# Patient Record
Sex: FEMALE | Race: WHITE | NOT HISPANIC OR LATINO | Employment: OTHER | URBAN - METROPOLITAN AREA
[De-identification: names, ages, dates, MRNs, and addresses within clinical notes are randomized per-mention and may not be internally consistent; named-entity substitution may affect disease eponyms.]

---

## 2024-10-12 ENCOUNTER — HOSPITAL ENCOUNTER (INPATIENT)
Facility: HOSPITAL | Age: 84
LOS: 2 days | Discharge: HOME | End: 2024-10-14
Attending: EMERGENCY MEDICINE | Admitting: INTERNAL MEDICINE
Payer: MEDICARE

## 2024-10-12 DIAGNOSIS — J18.9 PNEUMONIA OF LEFT LOWER LOBE DUE TO INFECTIOUS ORGANISM: Primary | ICD-10-CM

## 2024-10-12 DIAGNOSIS — J45.901 ASTHMA WITH ACUTE EXACERBATION, UNSPECIFIED ASTHMA SEVERITY, UNSPECIFIED WHETHER PERSISTENT: ICD-10-CM

## 2024-10-12 LAB
ALBUMIN SERPL-MCNC: 3.5 G/DL (ref 3.5–5.2)
ALBUMIN/GLOB SERPL: 1.1 G/DL
ALP SERPL-CCNC: 132 U/L (ref 39–117)
ALT SERPL W P-5'-P-CCNC: 22 U/L (ref 1–33)
ANION GAP SERPL CALCULATED.3IONS-SCNC: 12 MMOL/L (ref 5–15)
AST SERPL-CCNC: 32 U/L (ref 1–32)
B PARAPERT DNA SPEC QL NAA+PROBE: NOT DETECTED
B PERT DNA SPEC QL NAA+PROBE: NOT DETECTED
BASOPHILS # BLD AUTO: 0.1 10*3/MM3 (ref 0–0.2)
BASOPHILS NFR BLD AUTO: 0.5 % (ref 0–1.5)
BILIRUB SERPL-MCNC: 0.7 MG/DL (ref 0–1.2)
BUN SERPL-MCNC: 11 MG/DL (ref 8–23)
BUN/CREAT SERPL: 13.1 (ref 7–25)
C PNEUM DNA NPH QL NAA+NON-PROBE: NOT DETECTED
CALCIUM SPEC-SCNC: 9.1 MG/DL (ref 8.6–10.5)
CHLORIDE SERPL-SCNC: 98 MMOL/L (ref 98–107)
CO2 SERPL-SCNC: 24 MMOL/L (ref 22–29)
CREAT SERPL-MCNC: 0.84 MG/DL (ref 0.57–1)
D-LACTATE SERPL-SCNC: 0.9 MMOL/L (ref 0.5–2)
DEPRECATED RDW RBC AUTO: 42.6 FL (ref 37–54)
EGFRCR SERPLBLD CKD-EPI 2021: 68.2 ML/MIN/1.73
EOSINOPHIL # BLD AUTO: 0.34 10*3/MM3 (ref 0–0.4)
EOSINOPHIL NFR BLD AUTO: 1.8 % (ref 0.3–6.2)
ERYTHROCYTE [DISTWIDTH] IN BLOOD BY AUTOMATED COUNT: 13.3 % (ref 12.3–15.4)
FLUAV SUBTYP SPEC NAA+PROBE: NOT DETECTED
FLUBV RNA ISLT QL NAA+PROBE: NOT DETECTED
GLOBULIN UR ELPH-MCNC: 3.1 GM/DL
GLUCOSE SERPL-MCNC: 136 MG/DL (ref 65–99)
HADV DNA SPEC NAA+PROBE: NOT DETECTED
HCOV 229E RNA SPEC QL NAA+PROBE: NOT DETECTED
HCOV HKU1 RNA SPEC QL NAA+PROBE: NOT DETECTED
HCOV NL63 RNA SPEC QL NAA+PROBE: NOT DETECTED
HCOV OC43 RNA SPEC QL NAA+PROBE: NOT DETECTED
HCT VFR BLD AUTO: 41 % (ref 34–46.6)
HGB BLD-MCNC: 14.2 G/DL (ref 12–15.9)
HMPV RNA NPH QL NAA+NON-PROBE: NOT DETECTED
HPIV1 RNA ISLT QL NAA+PROBE: NOT DETECTED
HPIV2 RNA SPEC QL NAA+PROBE: NOT DETECTED
HPIV3 RNA NPH QL NAA+PROBE: NOT DETECTED
HPIV4 P GENE NPH QL NAA+PROBE: NOT DETECTED
IMM GRANULOCYTES # BLD AUTO: 0.13 10*3/MM3 (ref 0–0.05)
IMM GRANULOCYTES NFR BLD AUTO: 0.7 % (ref 0–0.5)
LYMPHOCYTES # BLD AUTO: 2.26 10*3/MM3 (ref 0.7–3.1)
LYMPHOCYTES NFR BLD AUTO: 12.2 % (ref 19.6–45.3)
M PNEUMO IGG SER IA-ACNC: NOT DETECTED
MCH RBC QN AUTO: 30.7 PG (ref 26.6–33)
MCHC RBC AUTO-ENTMCNC: 34.6 G/DL (ref 31.5–35.7)
MCV RBC AUTO: 88.6 FL (ref 79–97)
MONOCYTES # BLD AUTO: 1.09 10*3/MM3 (ref 0.1–0.9)
MONOCYTES NFR BLD AUTO: 5.9 % (ref 5–12)
NEUTROPHILS NFR BLD AUTO: 14.61 10*3/MM3 (ref 1.7–7)
NEUTROPHILS NFR BLD AUTO: 78.9 % (ref 42.7–76)
NRBC BLD AUTO-RTO: 0 /100 WBC (ref 0–0.2)
PLATELET # BLD AUTO: 279 10*3/MM3 (ref 140–450)
PMV BLD AUTO: 9.6 FL (ref 6–12)
POTASSIUM SERPL-SCNC: 4.1 MMOL/L (ref 3.5–5.2)
PROT SERPL-MCNC: 6.6 G/DL (ref 6–8.5)
RBC # BLD AUTO: 4.63 10*6/MM3 (ref 3.77–5.28)
RHINOVIRUS RNA SPEC NAA+PROBE: NOT DETECTED
RSV RNA NPH QL NAA+NON-PROBE: NOT DETECTED
SARS-COV-2 RNA NPH QL NAA+NON-PROBE: NOT DETECTED
SODIUM SERPL-SCNC: 134 MMOL/L (ref 136–145)
WBC NRBC COR # BLD AUTO: 18.53 10*3/MM3 (ref 3.4–10.8)

## 2024-10-12 PROCEDURE — 87040 BLOOD CULTURE FOR BACTERIA: CPT | Performed by: PHYSICIAN ASSISTANT

## 2024-10-12 PROCEDURE — 93005 ELECTROCARDIOGRAM TRACING: CPT

## 2024-10-12 PROCEDURE — 83605 ASSAY OF LACTIC ACID: CPT | Performed by: PHYSICIAN ASSISTANT

## 2024-10-12 PROCEDURE — 80053 COMPREHEN METABOLIC PANEL: CPT | Performed by: PHYSICIAN ASSISTANT

## 2024-10-12 PROCEDURE — 0202U NFCT DS 22 TRGT SARS-COV-2: CPT | Performed by: PHYSICIAN ASSISTANT

## 2024-10-12 PROCEDURE — 93005 ELECTROCARDIOGRAM TRACING: CPT | Performed by: EMERGENCY MEDICINE

## 2024-10-12 PROCEDURE — 85025 COMPLETE CBC W/AUTO DIFF WBC: CPT | Performed by: PHYSICIAN ASSISTANT

## 2024-10-12 PROCEDURE — 25010000002 METHYLPREDNISOLONE PER 125 MG: Performed by: PHYSICIAN ASSISTANT

## 2024-10-12 PROCEDURE — 93010 ELECTROCARDIOGRAM REPORT: CPT | Performed by: INTERNAL MEDICINE

## 2024-10-12 PROCEDURE — 36415 COLL VENOUS BLD VENIPUNCTURE: CPT

## 2024-10-12 PROCEDURE — 99285 EMERGENCY DEPT VISIT HI MDM: CPT

## 2024-10-12 PROCEDURE — 25010000002 CEFTRIAXONE PER 250 MG: Performed by: PHYSICIAN ASSISTANT

## 2024-10-12 PROCEDURE — 94799 UNLISTED PULMONARY SVC/PX: CPT

## 2024-10-12 RX ORDER — AMOXICILLIN 250 MG
2 CAPSULE ORAL 2 TIMES DAILY PRN
Status: DISCONTINUED | OUTPATIENT
Start: 2024-10-12 | End: 2024-10-14 | Stop reason: HOSPADM

## 2024-10-12 RX ORDER — IPRATROPIUM BROMIDE AND ALBUTEROL SULFATE 2.5; .5 MG/3ML; MG/3ML
3 SOLUTION RESPIRATORY (INHALATION) ONCE
Status: COMPLETED | OUTPATIENT
Start: 2024-10-12 | End: 2024-10-12

## 2024-10-12 RX ORDER — BISACODYL 5 MG/1
5 TABLET, DELAYED RELEASE ORAL DAILY PRN
Status: DISCONTINUED | OUTPATIENT
Start: 2024-10-12 | End: 2024-10-14 | Stop reason: HOSPADM

## 2024-10-12 RX ORDER — ONDANSETRON 4 MG/1
4 TABLET, ORALLY DISINTEGRATING ORAL EVERY 6 HOURS PRN
Status: DISCONTINUED | OUTPATIENT
Start: 2024-10-12 | End: 2024-10-14 | Stop reason: HOSPADM

## 2024-10-12 RX ORDER — POLYETHYLENE GLYCOL 3350 17 G/17G
17 POWDER, FOR SOLUTION ORAL DAILY PRN
Status: DISCONTINUED | OUTPATIENT
Start: 2024-10-12 | End: 2024-10-14 | Stop reason: HOSPADM

## 2024-10-12 RX ORDER — ACETAMINOPHEN 650 MG/1
650 SUPPOSITORY RECTAL EVERY 4 HOURS PRN
Status: DISCONTINUED | OUTPATIENT
Start: 2024-10-12 | End: 2024-10-13

## 2024-10-12 RX ORDER — ONDANSETRON 2 MG/ML
4 INJECTION INTRAMUSCULAR; INTRAVENOUS EVERY 6 HOURS PRN
Status: DISCONTINUED | OUTPATIENT
Start: 2024-10-12 | End: 2024-10-14 | Stop reason: HOSPADM

## 2024-10-12 RX ORDER — ASPIRIN 325 MG
325 TABLET ORAL DAILY
Status: DISCONTINUED | OUTPATIENT
Start: 2024-10-13 | End: 2024-10-14 | Stop reason: HOSPADM

## 2024-10-12 RX ORDER — METHYLPREDNISOLONE SODIUM SUCCINATE 125 MG/2ML
125 INJECTION, POWDER, LYOPHILIZED, FOR SOLUTION INTRAMUSCULAR; INTRAVENOUS ONCE
Status: COMPLETED | OUTPATIENT
Start: 2024-10-12 | End: 2024-10-12

## 2024-10-12 RX ORDER — ALBUTEROL SULFATE 0.83 MG/ML
2.5 SOLUTION RESPIRATORY (INHALATION)
Status: COMPLETED | OUTPATIENT
Start: 2024-10-12 | End: 2024-10-12

## 2024-10-12 RX ORDER — BISACODYL 10 MG
10 SUPPOSITORY, RECTAL RECTAL DAILY PRN
Status: DISCONTINUED | OUTPATIENT
Start: 2024-10-12 | End: 2024-10-14 | Stop reason: HOSPADM

## 2024-10-12 RX ORDER — ALBUTEROL SULFATE 0.83 MG/ML
2.5 SOLUTION RESPIRATORY (INHALATION) EVERY 6 HOURS PRN
Status: DISCONTINUED | OUTPATIENT
Start: 2024-10-12 | End: 2024-10-14 | Stop reason: HOSPADM

## 2024-10-12 RX ORDER — ACETAMINOPHEN 325 MG/1
650 TABLET ORAL EVERY 4 HOURS PRN
Status: DISCONTINUED | OUTPATIENT
Start: 2024-10-12 | End: 2024-10-14 | Stop reason: HOSPADM

## 2024-10-12 RX ORDER — METHYLPREDNISOLONE SODIUM SUCCINATE 40 MG/ML
40 INJECTION, POWDER, LYOPHILIZED, FOR SOLUTION INTRAMUSCULAR; INTRAVENOUS EVERY 8 HOURS
Status: DISCONTINUED | OUTPATIENT
Start: 2024-10-12 | End: 2024-10-13

## 2024-10-12 RX ORDER — IPRATROPIUM BROMIDE AND ALBUTEROL SULFATE 2.5; .5 MG/3ML; MG/3ML
3 SOLUTION RESPIRATORY (INHALATION)
Status: DISCONTINUED | OUTPATIENT
Start: 2024-10-12 | End: 2024-10-14 | Stop reason: HOSPADM

## 2024-10-12 RX ORDER — ACETAMINOPHEN 160 MG/5ML
650 SOLUTION ORAL EVERY 4 HOURS PRN
Status: DISCONTINUED | OUTPATIENT
Start: 2024-10-12 | End: 2024-10-13

## 2024-10-12 RX ADMIN — CEFTRIAXONE SODIUM 1000 MG: 1 INJECTION, POWDER, FOR SOLUTION INTRAMUSCULAR; INTRAVENOUS at 18:14

## 2024-10-12 RX ADMIN — ALBUTEROL SULFATE 2.5 MG: 2.5 SOLUTION RESPIRATORY (INHALATION) at 19:45

## 2024-10-12 RX ADMIN — METHYLPREDNISOLONE SODIUM SUCCINATE 125 MG: 125 INJECTION INTRAMUSCULAR; INTRAVENOUS at 18:12

## 2024-10-12 RX ADMIN — IPRATROPIUM BROMIDE AND ALBUTEROL SULFATE 3 ML: .5; 3 SOLUTION RESPIRATORY (INHALATION) at 18:02

## 2024-10-12 RX ADMIN — ALBUTEROL SULFATE 2.5 MG: 2.5 SOLUTION RESPIRATORY (INHALATION) at 19:40

## 2024-10-12 NOTE — ED NOTES
Pt to ED from [facility] via pv. Pt seen at urgent care [facility] diagnosed with pneumonia and sent to ED.

## 2024-10-12 NOTE — ED NOTES
Nursing report ED to floor  Sisi Francisco  84 y.o.  female    HPI :  HPI  Stated Reason for Visit: soa, cough  History Obtained From: patient, family    Chief Complaint  Chief Complaint   Patient presents with    Shortness of Breath    Cough       Admitting doctor:   Mercedes Gleason MD    Admitting diagnosis:   The primary encounter diagnosis was Pneumonia of left lower lobe due to infectious organism. A diagnosis of Asthma with acute exacerbation, unspecified asthma severity, unspecified whether persistent was also pertinent to this visit.    Code status:   Current Code Status       Date Active Code Status Order ID Comments User Context       10/12/2024 1955 CPR (Attempt to Resuscitate) [id number]  Mercedes Gleason MD ED        Question Answer    Code Status (Patient has no pulse and is not breathing) CPR (Attempt to Resuscitate)    Medical Interventions (Patient has pulse or is breathing) Full                    Allergies:   Amlodipine besylate, Lisinopril, Losartan, and Simvastatin    Isolation:   No active isolations    Intake and Output    Intake/Output Summary (Last 24 hours) at 10/12/2024 1956  Last data filed at 10/12/2024 1859  Gross per 24 hour   Intake 100 ml   Output --   Net 100 ml       Weight:       10/12/24  1715   Weight: 77.1 kg (170 lb)       Most recent vitals:   Vitals:    10/12/24 1802 10/12/24 1831 10/12/24 1901 10/12/24 1902   BP:  129/81 132/83    BP Location:       Patient Position:       Pulse: 97 98 99 100   Resp: 18      Temp:       SpO2: 93% 92% 92% 92%   Weight:       Height:           Active LDAs/IV Access:   Lines, Drains & Airways       Active LDAs       Name Placement date Placement time Site Days    Peripheral IV 10/12/24 1737 Left Antecubital 10/12/24  1737  Antecubital  less than 1                    Labs (abnormal labs have a star):   Labs Reviewed   COMPREHENSIVE METABOLIC PANEL - Abnormal; Notable for the following components:       Result Value    Glucose 136  (*)     Sodium 134 (*)     Alkaline Phosphatase 132 (*)     All other components within normal limits    Narrative:     GFR Normal >60  Chronic Kidney Disease <60  Kidney Failure <15    The GFR formula is only valid for adults with stable renal function between ages 18 and 70.   CBC WITH AUTO DIFFERENTIAL - Abnormal; Notable for the following components:    WBC 18.53 (*)     Neutrophil % 78.9 (*)     Lymphocyte % 12.2 (*)     Immature Grans % 0.7 (*)     Neutrophils, Absolute 14.61 (*)     Monocytes, Absolute 1.09 (*)     Immature Grans, Absolute 0.13 (*)     All other components within normal limits   RESPIRATORY PANEL PCR W/ COVID-19 (SARS-COV-2), NP SWAB IN UTM/VTP, 2 HR TAT - Normal    Narrative:     In the setting of a positive respiratory panel with a viral infection PLUS a negative procalcitonin without other underlying concern for bacterial infection, consider observing off antibiotics or discontinuation of antibiotics and continue supportive care. If the respiratory panel is positive for atypical bacterial infection (Bordetella pertussis, Chlamydophila pneumoniae, or Mycoplasma pneumoniae), consider antibiotic de-escalation to target atypical bacterial infection.   LACTIC ACID, PLASMA - Normal   BLOOD CULTURE   BLOOD CULTURE   CBC AND DIFFERENTIAL    Narrative:     The following orders were created for panel order CBC & Differential.  Procedure                               Abnormality         Status                     ---------                               -----------         ------                     CBC Auto Differential[[id number]]        Abnormal            Final result                 Please view results for these tests on the individual orders.       EKG:   ECG 12 Lead Dyspnea   Preliminary Result   HEART JNTU=501  bpm   RR Gyudoqop=306  ms   CA Sptcjtyz=570  ms   P Horizontal Axis=-18  deg   P Front Axis=41  deg   QRSD Interval=92  ms   QT Udczfpgl=980  ms   COgD=781  ms   QRS Axis=4  deg   T Wave  Axis=46  deg   - ABNORMAL ECG -   Sinus tachycardia   Atrial premature complexes   Low voltage, precordial leads   RSR' in V1 or V2, right VCD or RVH   Date and Time of Study:2024-10-12 17:16:47          Meds given in ED:   Medications   acetaminophen (TYLENOL) tablet 650 mg (has no administration in time range)     Or   acetaminophen (TYLENOL) 160 MG/5ML oral solution 650 mg (has no administration in time range)     Or   acetaminophen (TYLENOL) suppository 650 mg (has no administration in time range)   ondansetron ODT (ZOFRAN-ODT) disintegrating tablet 4 mg (has no administration in time range)     Or   ondansetron (ZOFRAN) injection 4 mg (has no administration in time range)   melatonin tablet 2.5 mg (has no administration in time range)   sennosides-docusate (PERICOLACE) 8.6-50 MG per tablet 2 tablet (has no administration in time range)     And   polyethylene glycol (MIRALAX) packet 17 g (has no administration in time range)     And   bisacodyl (DULCOLAX) EC tablet 5 mg (has no administration in time range)     And   bisacodyl (DULCOLAX) suppository 10 mg (has no administration in time range)   methylPREDNISolone sodium succinate (SOLU-Medrol) injection 40 mg (has no administration in time range)   ipratropium-albuterol (DUO-NEB) nebulizer solution 3 mL (has no administration in time range)   albuterol (PROVENTIL) nebulizer solution 0.083% 2.5 mg/3mL (has no administration in time range)   cefTRIAXone (ROCEPHIN) 1,000 mg in sodium chloride 0.9 % 100 mL MBP (0 mg Intravenous Stopped 10/12/24 1859)   ipratropium-albuterol (DUO-NEB) nebulizer solution 3 mL (3 mL Nebulization Given 10/12/24 1802)   methylPREDNISolone sodium succinate (SOLU-Medrol) injection 125 mg (125 mg Intravenous Given 10/12/24 1812)   albuterol (PROVENTIL) nebulizer solution 0.083% 2.5 mg/3mL (2.5 mg Nebulization Given 10/12/24 1945)       Imaging results:  No radiology results for the last day    Ambulatory status:   - Standby    Social  issues:   Social History     Socioeconomic History    Marital status:    Tobacco Use    Smoking status: Never    Smokeless tobacco: Never   Vaping Use    Vaping status: Never Used   Substance and Sexual Activity    Alcohol use: Never    Drug use: Never    Sexual activity: Not Currently       Peripheral Neurovascular  Peripheral Neurovascular (Adult)  Peripheral Neurovascular WDL: WDL    Neuro Cognitive  Neuro Cognitive (Adult)  Cognitive/Neuro/Behavioral WDL: WDL    Learning  Learning Assessment  Learning Readiness and Ability: no barriers identified    Respiratory  Respiratory WDL  Respiratory WDL: .WDL except, cough  Cough Frequency: frequent  Cough Type: congested    Abdominal Pain       Pain Assessments  Pain (Adult)  (0-10) Pain Rating: Rest: 0    NIH Stroke Scale       Akanksha Olsen RN  10/12/24 19:56 EDT

## 2024-10-12 NOTE — ED PROVIDER NOTES
MD ATTESTATION NOTE  I supervised care provided by the midlevel provider. We have discussed this patient's history, physical exam, and treatment plan. I have reviewed the midlevel provider's note and I agree with the midlevel provider's findings and plan of care.   SHARED VISIT: This visit was performed by BOTH a physician and an APC. The substantive portion of the medical decision making was performed by this attesting physician who made or approved the management plan and takes responsibility for patient management. All studies in the APC note (if performed) were independently interpreted by me.   I have personally had a face to face encounter with the patient.     PCP: Provider, No Known  Patient Care Team:  Provider, No Known as PCP -      Sisi Francisco is a 84 y.o. female who presents to the ED c/o shortness of breath.  Patient reports that she has been feeling ill for last couple weeks.  Patient reports that she has had a productive cough, chills, short of breath.  Patient denies any chest pain.  No fevers.  Patient reports that she recently was treated with steroids as well as amoxicillin without relief.  Patient reports that she was seen at urgent care today and referred to the emergency department for evaluation.    On exam:  General: NAD.  Head: NCAT.  ENT: nares patent, no scleral icterus  Neck: Supple, trachea midline.  Cardiac: regular rate and rhythm.  Lungs: normal effort, crackles left lower lung  Abdomen: Soft, nondistended, NTTP, no rebound tenderness, no guarding or rigidity.   Extremities: Moves all extremities well, no peripheral edema  Neuro: alert, MAEW, follows commands  Psych: calm, cooperative  Skin: Warm, dry.    Medical Decision Making:  After the initial H&P, I discussed pertinent information from history and physical exam with patient/family.  Discussed differential diagnosis.  Discussed plan for ED evaluation/work-up/treatment.  All questions answered.  Patient/family is  agreeable with plan.    ED Course as of 10/12/24 2221   Sat Oct 12, 2024   1847 WBC(!): 18.53 [KA]   1847 Lactate: 0.9 [KA]   1847 Glucose(!): 136 [KA]   1847 Creatinine: 0.84 [KA]   1852 My Independent interpretation of the EKG performed at 1716 is sinus tachycardia rate 102 normal axis normal intervals no ST elevation, no prior for comparison. [KA]   1943 I reassessed the patient, still has some wheezing and rhonchi, additional nebs ordered.  Counseled her and daughter at the bedside and all of her lab and imaging results.  Due to her leukocytosis, asthma exacerbation, acute left lower lobe pneumonia, recommend admission for further evaluation and treatment and she is agreeable [KA]   1946 I discussed patient with Dr. Gleason, hospitalist including history presentation workup and she agrees to admit. [KA]      ED Course User Index  [KA] Janett Santana PA-C       Diagnosis  Final diagnoses:   Pneumonia of left lower lobe due to infectious organism   Asthma with acute exacerbation, unspecified asthma severity, unspecified whether persistent          Geraldo Jackson MD  10/12/24 2221
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
## 2024-10-13 PROBLEM — J45.901 ASTHMA EXACERBATION: Status: ACTIVE | Noted: 2024-10-13

## 2024-10-13 LAB
ANION GAP SERPL CALCULATED.3IONS-SCNC: 10 MMOL/L (ref 5–15)
BUN SERPL-MCNC: 14 MG/DL (ref 8–23)
BUN/CREAT SERPL: 18.9 (ref 7–25)
CALCIUM SPEC-SCNC: 9 MG/DL (ref 8.6–10.5)
CHLORIDE SERPL-SCNC: 102 MMOL/L (ref 98–107)
CO2 SERPL-SCNC: 23 MMOL/L (ref 22–29)
CREAT SERPL-MCNC: 0.74 MG/DL (ref 0.57–1)
DEPRECATED RDW RBC AUTO: 43 FL (ref 37–54)
EGFRCR SERPLBLD CKD-EPI 2021: 79.4 ML/MIN/1.73
ERYTHROCYTE [DISTWIDTH] IN BLOOD BY AUTOMATED COUNT: 13.1 % (ref 12.3–15.4)
GLUCOSE SERPL-MCNC: 225 MG/DL (ref 65–99)
HCT VFR BLD AUTO: 38.5 % (ref 34–46.6)
HGB BLD-MCNC: 12.6 G/DL (ref 12–15.9)
MCH RBC QN AUTO: 28.8 PG (ref 26.6–33)
MCHC RBC AUTO-ENTMCNC: 32.7 G/DL (ref 31.5–35.7)
MCV RBC AUTO: 88.1 FL (ref 79–97)
PLATELET # BLD AUTO: 262 10*3/MM3 (ref 140–450)
PMV BLD AUTO: 9.5 FL (ref 6–12)
POTASSIUM SERPL-SCNC: 3.8 MMOL/L (ref 3.5–5.2)
QT INTERVAL: 333 MS
QTC INTERVAL: 434 MS
RBC # BLD AUTO: 4.37 10*6/MM3 (ref 3.77–5.28)
SODIUM SERPL-SCNC: 135 MMOL/L (ref 136–145)
WBC NRBC COR # BLD AUTO: 13.36 10*3/MM3 (ref 3.4–10.8)

## 2024-10-13 PROCEDURE — 85027 COMPLETE CBC AUTOMATED: CPT | Performed by: INTERNAL MEDICINE

## 2024-10-13 PROCEDURE — 94664 DEMO&/EVAL PT USE INHALER: CPT

## 2024-10-13 PROCEDURE — 63710000001 PREDNISONE PER 1 MG: Performed by: HOSPITALIST

## 2024-10-13 PROCEDURE — 25010000002 METHYLPREDNISOLONE PER 40 MG: Performed by: INTERNAL MEDICINE

## 2024-10-13 PROCEDURE — 36415 COLL VENOUS BLD VENIPUNCTURE: CPT | Performed by: INTERNAL MEDICINE

## 2024-10-13 PROCEDURE — 94799 UNLISTED PULMONARY SVC/PX: CPT

## 2024-10-13 PROCEDURE — 97110 THERAPEUTIC EXERCISES: CPT

## 2024-10-13 PROCEDURE — 25010000002 CEFTRIAXONE PER 250 MG: Performed by: HOSPITALIST

## 2024-10-13 PROCEDURE — 94761 N-INVAS EAR/PLS OXIMETRY MLT: CPT

## 2024-10-13 PROCEDURE — 97162 PT EVAL MOD COMPLEX 30 MIN: CPT

## 2024-10-13 PROCEDURE — 80048 BASIC METABOLIC PNL TOTAL CA: CPT | Performed by: INTERNAL MEDICINE

## 2024-10-13 RX ORDER — CEFDINIR 300 MG/1
300 CAPSULE ORAL EVERY 12 HOURS SCHEDULED
Status: DISCONTINUED | OUTPATIENT
Start: 2024-10-14 | End: 2024-10-14 | Stop reason: HOSPADM

## 2024-10-13 RX ORDER — PREDNISONE 20 MG/1
40 TABLET ORAL
Status: DISCONTINUED | OUTPATIENT
Start: 2024-10-13 | End: 2024-10-14 | Stop reason: HOSPADM

## 2024-10-13 RX ADMIN — METHYLPREDNISOLONE SODIUM SUCCINATE 40 MG: 40 INJECTION, POWDER, FOR SOLUTION INTRAMUSCULAR; INTRAVENOUS at 01:17

## 2024-10-13 RX ADMIN — ASPIRIN 325 MG: 325 TABLET ORAL at 08:52

## 2024-10-13 RX ADMIN — PREDNISONE 40 MG: 20 TABLET ORAL at 14:47

## 2024-10-13 RX ADMIN — CEFTRIAXONE 2000 MG: 2 INJECTION, POWDER, FOR SOLUTION INTRAMUSCULAR; INTRAVENOUS at 14:48

## 2024-10-13 RX ADMIN — IPRATROPIUM BROMIDE AND ALBUTEROL SULFATE 3 ML: 2.5; .5 SOLUTION RESPIRATORY (INHALATION) at 07:28

## 2024-10-13 RX ADMIN — IPRATROPIUM BROMIDE AND ALBUTEROL SULFATE 3 ML: 2.5; .5 SOLUTION RESPIRATORY (INHALATION) at 19:57

## 2024-10-13 RX ADMIN — METHYLPREDNISOLONE SODIUM SUCCINATE 40 MG: 40 INJECTION, POWDER, FOR SOLUTION INTRAMUSCULAR; INTRAVENOUS at 10:37

## 2024-10-13 RX ADMIN — IPRATROPIUM BROMIDE AND ALBUTEROL SULFATE 3 ML: 2.5; .5 SOLUTION RESPIRATORY (INHALATION) at 15:13

## 2024-10-13 NOTE — THERAPY EVALUATION
Patient Name: Sisi Francisco  : 1940    MRN: [id number]                              Today's Date: 10/13/2024       Admit Date: 10/12/2024    Visit Dx:     ICD-10-CM ICD-9-CM   1. Pneumonia of left lower lobe due to infectious organism  J18.9 486   2. Asthma with acute exacerbation, unspecified asthma severity, unspecified whether persistent  J45.901 493.92     Patient Active Problem List   Diagnosis    Left lower lobe pneumonia     History reviewed. No pertinent past medical history.  History reviewed. No pertinent surgical history.   General Information       Row Name 10/13/24 0949          Physical Therapy Time and Intention    Document Type evaluation  -CS     Mode of Treatment physical therapy  -CS       Row Name 10/13/24 0949          General Information    Patient Profile Reviewed yes  -CS     Prior Level of Function independent:  -CS       Row Name 10/13/24 0949          Cognition    Orientation Status (Cognition) oriented x 3  -CS       Row Name 10/13/24 0949          Safety Issues/Impairments Affecting Functional Mobility    Impairments Affecting Function (Mobility) balance;endurance/activity tolerance;strength  -CS               User Key  (r) = Recorded By, (t) = Taken By, (c) = Cosigned By      Initials Name Provider Type    CS Emile Gonzales, PT Physical Therapist                   Mobility       Row Name 10/13/24 0949          Bed Mobility    Bed Mobility supine-sit  -CS     Supine-Sit [place] (Bed Mobility) modified independence  -CS       Row Name 10/13/24 0949          Sit-Stand Transfer    Sit-Stand [place] (Transfers) standby assist  -CS       Row Name 10/13/24 0949          Gait/Stairs (Locomotion)    [place] Level (Gait) minimum assist (75% patient effort)  -CS     Distance in Feet (Gait) 120  2 standing rest breaks for fatigeu  -CS               User Key  (r) = Recorded By, (t) = Taken By, (c) = Cosigned By      Initials Name Provider Type    Emile Rojas, PT  Physical Therapist                   Obj/Interventions       Row Name 10/13/24 0950          Range of Motion Comprehensive    General Range of Motion no range of motion deficits identified  -CS       Row Name 10/13/24 0950          Strength Comprehensive (MMT)    General Manual Muscle Testing (MMT) Assessment no strength deficits identified  -CS               User Key  (r) = Recorded By, (t) = Taken By, (c) = Cosigned By      Initials Name Provider Type    Emile Rojas, PT Physical Therapist                   Goals/Plan       Row Name 10/13/24 0951          Bed Mobility Goal 1 (PT)    Activity/Assistive Device (Bed Mobility Goal 1, PT) bed mobility activities, all  -CS     [place] Level/Cues Needed (Bed Mobility Goal 1, PT) independent  -CS     Time Frame (Bed Mobility Goal 1, PT) 1 week  -CS       Row Name 10/13/24 0951          Transfer Goal 1 (PT)    Activity/Assistive Device (Transfer Goal 1, PT) sit-to-stand/stand-to-sit;bed-to-chair/chair-to-bed  -CS     [place] Level/Cues Needed (Transfer Goal 1, PT) independent  -CS     Time Frame (Transfer Goal 1, PT) 1 week  -CS       Row Name 10/13/24 0951          Gait Training Goal 1 (PT)    Activity/Assistive Device (Gait Training Goal 1, PT) assistive device use  -CS     [place] Level (Gait Training Goal 1, PT) modified independence  -CS     Distance (Gait Training Goal 1, PT) 300  -CS     Time Frame (Gait Training Goal 1, PT) 1 week  -CS       Row Name 10/13/24 0951          Therapy Assessment/Plan (PT)    Planned Therapy Interventions (PT) balance training;bed mobility training;gait training;home exercise program;manual therapy techniques;neuromuscular re-education;transfer training;stretching;strengthening;stair training;ROM (range of motion);patient/family education  -CS               User Key  (r) = Recorded By, (t) = Taken By, (c) = Cosigned By      Initials Name Provider Type    Emile Rojas, PT Physical Therapist                    Clinical Impression       Row Name 10/13/24 0950          Pain    Pretreatment Pain Rating 0/10 - no pain  -CS     Posttreatment Pain Rating 0/10 - no pain  -CS       Row Name 10/13/24 0950          Plan of Care Review    Plan of Care Reviewed With patient  -CS       Row Name 10/13/24 0950          Therapy Assessment/Plan (PT)    Patient/Family Therapy Goals Statement (PT) home  -CS     Criteria for Skilled Interventions Met (PT) yes  -CS     Therapy Frequency (PT) 6 times/wk  -CS       Row Name 10/13/24 0950          Vital Signs    O2 Delivery Pre Treatment supplemental O2  -CS     O2 Delivery Intra Treatment room air  -CS     O2 Delivery Post Treatment room air  -CS       Row Name 10/13/24 0950          Positioning and Restraints    Pre-Treatment Position in bed  -CS     Post Treatment Position bed  -CS     In Bed supine;call light within reach;encouraged to call for assist;exit alarm on;notified nsg;with family/caregiver  -CS               User Key  (r) = Recorded By, (t) = Taken By, (c) = Cosigned By      Initials Name Provider Type    Emile Rojas, PT Physical Therapist                   Outcome Measures       Row Name 10/13/24 0951 10/13/24 0600       How much help from another person do you currently need...    Turning from your back to your side while in flat bed without using bedrails? 4  -CS 3  -BZ    Moving from lying on back to sitting on the side of a flat bed without bedrails? 3  -CS 3  -BZ    Moving to and from a bed to a chair (including a wheelchair)? 3  -CS 3  -BZ    Standing up from a chair using your arms (e.g., wheelchair, bedside chair)? 3  -CS 3  -BZ    Climbing 3-5 steps with a railing? 3  -CS 3  -BZ    To walk in hospital room? 3  -CS 3  -BZ    AM-PAC 6 Clicks Score (PT) 19  -CS 18  -BZ    Highest Level of Mobility Goal 6 --> Walk 10 steps or more  -CS 6 --> Walk 10 steps or more  -BZ      Row Name 10/13/24 0951          Functional Assessment    Outcome Measure Options AM-PAC 6 Clicks  Basic Mobility (PT)  -               User Key  (r) = Recorded By, (t) = Taken By, (c) = Cosigned By      Initials Name Provider Type    Emile Rojas, PT Physical Therapist    Lakshmi Griffith RN Registered Nurse                                 Physical Therapy Education       Title: PT OT SLP Therapies (Done)       Topic: Physical Therapy (Done)       Point: Mobility training (Done)       Learning Progress Summary            Patient Acceptance, E,TB, VU,NR by  at 10/13/2024 0951                      Point: Home exercise program (Done)       Learning Progress Summary            Patient Acceptance, E,TB, VU,NR by  at 10/13/2024 0951                      Point: Body mechanics (Done)       Learning Progress Summary            Patient Acceptance, E,TB, VU,NR by  at 10/13/2024 0951                      Point: Precautions (Done)       Learning Progress Summary            Patient Acceptance, E,TB, VU,NR by  at 10/13/2024 0951                                      User Key       Initials Effective Dates Name Provider Type Discipline     07/11/23 -  Emile Gonzales, PT Physical Therapist PT                  PT Recommendation and Plan  Planned Therapy Interventions (PT): balance training, bed mobility training, gait training, home exercise program, manual therapy techniques, neuromuscular re-education, transfer training, stretching, strengthening, stair training, ROM (range of motion), patient/family education        Time Calculation:         PT Charges       Row Name 10/13/24 0954             Time Calculation    Start Time 0933  -      Stop Time 0948  -      Time Calculation (min) 15 min  -      PT Received On 10/13/24  -      PT - Next Appointment 10/14/24  -      PT Goal Re-Cert Due Date 10/20/24  -                User Key  (r) = Recorded By, (t) = Taken By, (c) = Cosigned By      Initials Name Provider Type    Emile Rojas, PT Physical Therapist                  Therapy Charges for  Today       Code Description Service Date Service Provider Modifiers Qty    20240043346 HC PT EVAL MOD COMPLEXITY 2 10/13/2024 Emile Gonzales, PT GP 1    29912061255 HC PT THER PROC EA 15 MIN 10/13/2024 Emile Gonzales, PT GP 1            PT G-Codes  Outcome Measure Options: AM-PAC 6 Clicks Basic Mobility (PT)  AM-PAC 6 Clicks Score (PT): 19  PT Discharge Summary  Anticipated Discharge Disposition (PT): home with home health, home with assist    Emile Gonzales, PT  10/13/2024
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
## 2024-10-13 NOTE — PLAN OF CARE
Goal Outcome Evaluation:  Plan of Care Reviewed With: patient      Pt admitted with L lower lobe pneumonia and presents with weakness. This visit she was able to perform bed mobility SBA, CGA with sit<> stand and [place] with walking 120' and tolerated with moderate complaints of fatigue. She needed a standing rest break while walking. She lives in [place] and is staying in a hotel here for the next couple of days per family. PT recommendation is to use a walker until she gains more strength and HHPT at d/c to improve functional mobility.           Anticipated Discharge Disposition (PT): home with home health, home with assist
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
## 2024-10-14 VITALS
RESPIRATION RATE: 18 BRPM | HEIGHT: 66 IN | WEIGHT: 168.1 LBS | OXYGEN SATURATION: 96 % | DIASTOLIC BLOOD PRESSURE: 72 MMHG | SYSTOLIC BLOOD PRESSURE: 148 MMHG | TEMPERATURE: 97.9 F | BODY MASS INDEX: 27.02 KG/M2 | HEART RATE: 68 BPM

## 2024-10-14 PROCEDURE — 97530 THERAPEUTIC ACTIVITIES: CPT

## 2024-10-14 PROCEDURE — 63710000001 PREDNISONE PER 1 MG: Performed by: HOSPITALIST

## 2024-10-14 RX ORDER — CEFDINIR 300 MG/1
300 CAPSULE ORAL EVERY 12 HOURS SCHEDULED
Qty: 10 CAPSULE | Refills: 0 | Status: ON HOLD | OUTPATIENT
Start: 2024-10-14 | End: 2024-10-18

## 2024-10-14 RX ORDER — ALBUTEROL SULFATE 90 UG/1
2 INHALANT RESPIRATORY (INHALATION) EVERY 4 HOURS PRN
Qty: 18 G | Refills: 0 | Status: ON HOLD | OUTPATIENT
Start: 2024-10-14 | End: 2024-10-18

## 2024-10-14 RX ADMIN — PREDNISONE 40 MG: 20 TABLET ORAL at 09:50

## 2024-10-14 RX ADMIN — CEFDINIR 300 MG: 300 CAPSULE ORAL at 09:50

## 2024-10-14 RX ADMIN — ASPIRIN 325 MG: 325 TABLET ORAL at 09:50

## 2024-10-14 NOTE — PLAN OF CARE
Goal Outcome Evaluation:  Plan of Care Reviewed With: patient        Progress: improving  Outcome Evaluation: Pt seen for PT this AM - trial ambulation without AD x 200' - SBA but frequent small LOB noted that pt recovers indep with stepping strategy. When using rwx x 200' SBA no LOB and improve walking pace noted. Did note de-sat to 70s first ambulation trial but pleth questionable - replaced finger probe and pleth improved, O2 in the 90s second ambulation trial. Educated pt on decreasing fall risk and using rwx at home, especially for right now. Also educated pt on potential a 4ww in the future if needed. She continues to benefit from skilled PT to address mobility deficits. Recommend home with  home health    Anticipated Discharge Disposition (PT): home with home health, home with assist

## 2024-10-14 NOTE — CASE MANAGEMENT/SOCIAL WORK
Case Management Discharge Note      Final Note: Home with [facility], walker thru [facility]. No additional CCP needs.         Selected Continued Care - Admitted Since 10/12/2024       Destination    No services have been selected for the patient.                Durable Medical Equipment    No services have been selected for the patient.                Dialysis/Infusion    No services have been selected for the patient.                Home Medical Care       Service Provider Services Address Phone Fax Patient Preferred    [facility] [address] [place] [id number] [phone] --              Therapy    No services have been selected for the patient.                Community Resources    No services have been selected for the patient.                Community & DME    No services have been selected for the patient.                         Final Discharge Disposition Code: 01 - home or self-care
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
## 2024-10-14 NOTE — PLAN OF CARE
Goal Outcome Evaluation:         Patient to be discharged home with daughter.
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
## 2024-10-14 NOTE — DISCHARGE PLACEMENT REQUEST
"Sisi Francisco (84 y.o. Female)       Date of Birth   1940    Social Security Number       Address   [address]   [place] [address] [place] [address]    Home Phone   [phone]    MRN   [id number]       Baptism   None    Marital Status                               Admission Date   10/12/24    Admission Type   Emergency    Admitting Provider   Mercedes Gleason MD    Attending Provider   Brian Tan MD    Department, Room/Bed   [address], S[address]/1       Discharge Date       Discharge Disposition       Discharge Destination                                 Attending Provider: Brian Tan MD    Allergies: Amlodipine Besylate, Lisinopril, Losartan, Simvastatin    Isolation: None   Infection: None   Code Status: CPR    Ht: 167.6 cm (66\")   Wt: 76.2 kg (168 lb 1.6 oz)    Admission Cmt: None   Principal Problem: Left lower lobe pneumonia [J18.9]                   Active Insurance as of 10/12/2024       Primary Coverage       Payor Plan Insurance Group Employer/Plan Group    [facility] MEDICARE REPLACEMENT [facility] MED ADV GROUP        Payor Plan Address Payor Plan Phone Number Payor Plan Fax Number Effective Dates    [address] [address]   4/1/2024 - None Entered    [facility] [id number]         Subscriber Name Subscriber Birth Date Member ID       SISI FRANCISCO 1940 [id number]                     Emergency Contacts        (Rel.) Home Phone Work Phone Mobile Phone    SUNNY REDD (Daughter) [phone] -- [phone]                "

## 2024-10-14 NOTE — PLAN OF CARE
Problem: Adult Inpatient Plan of Care  Goal: Plan of Care Review  10/14/2024 0633 by Slime Sal RN  Outcome: Progressing  Flowsheets  Taken 10/14/2024 0633  Outcome Evaluation: Alert and oriented x 4. VSS, on room air. Still with occasional productive cough. Standby assistance with ambulation, encouraged to use walker.  Plan of Care Reviewed With: patient    Goal Outcome Evaluation:  Plan of Care Reviewed With: patient      Progress: improving  Outcome Evaluation: Alert and oriented x 4. VSS, on room air. Still with occasional productive cough. Standby assistance with ambulation, encouraged to use walker.

## 2024-10-15 ENCOUNTER — APPOINTMENT (OUTPATIENT)
Dept: CT IMAGING | Facility: HOSPITAL | Age: 84
End: 2024-10-15
Payer: MEDICARE

## 2024-10-15 ENCOUNTER — HOSPITAL ENCOUNTER (OUTPATIENT)
Facility: HOSPITAL | Age: 84
Setting detail: OBSERVATION
Discharge: HOME | End: 2024-10-18
Attending: EMERGENCY MEDICINE | Admitting: STUDENT IN AN ORGANIZED HEALTH CARE EDUCATION/TRAINING PROGRAM
Payer: MEDICARE

## 2024-10-15 ENCOUNTER — APPOINTMENT (OUTPATIENT)
Dept: GENERAL RADIOLOGY | Facility: HOSPITAL | Age: 84
End: 2024-10-15
Payer: MEDICARE

## 2024-10-15 ENCOUNTER — NURSE TRIAGE (OUTPATIENT)
Dept: CALL CENTER | Facility: HOSPITAL | Age: 84
End: 2024-10-15
Payer: MEDICARE

## 2024-10-15 ENCOUNTER — READMISSION MANAGEMENT (OUTPATIENT)
Dept: CALL CENTER | Facility: HOSPITAL | Age: 84
End: 2024-10-15
Payer: MEDICARE

## 2024-10-15 DIAGNOSIS — Z87.09 HISTORY OF ASTHMA: ICD-10-CM

## 2024-10-15 DIAGNOSIS — R09.02 HYPOXIA: ICD-10-CM

## 2024-10-15 DIAGNOSIS — Z87.01 HISTORY OF PNEUMONIA: ICD-10-CM

## 2024-10-15 DIAGNOSIS — R06.00 DYSPNEA, UNSPECIFIED TYPE: Primary | ICD-10-CM

## 2024-10-15 PROBLEM — J45.909 ASTHMA: Status: ACTIVE | Noted: 2024-10-15

## 2024-10-15 PROBLEM — J18.9 CAP (COMMUNITY ACQUIRED PNEUMONIA): Status: ACTIVE | Noted: 2024-10-15

## 2024-10-15 PROBLEM — R79.89 ELEVATED SERUM CREATININE: Status: ACTIVE | Noted: 2024-10-15

## 2024-10-15 LAB
ALBUMIN SERPL-MCNC: 3.4 G/DL (ref 3.5–5.2)
ALBUMIN/GLOB SERPL: 1.2 G/DL
ALP SERPL-CCNC: 118 U/L (ref 39–117)
ALT SERPL W P-5'-P-CCNC: 22 U/L (ref 1–33)
ANION GAP SERPL CALCULATED.3IONS-SCNC: 8.8 MMOL/L (ref 5–15)
AST SERPL-CCNC: 26 U/L (ref 1–32)
BACTERIA SPEC RESP CULT: NORMAL
BASOPHILS # BLD AUTO: 0.01 10*3/MM3 (ref 0–0.2)
BASOPHILS NFR BLD AUTO: 0.1 % (ref 0–1.5)
BILIRUB SERPL-MCNC: 0.3 MG/DL (ref 0–1.2)
BUN SERPL-MCNC: 21 MG/DL (ref 8–23)
BUN/CREAT SERPL: 20.6 (ref 7–25)
CALCIUM SPEC-SCNC: 9.5 MG/DL (ref 8.6–10.5)
CHLORIDE SERPL-SCNC: 104 MMOL/L (ref 98–107)
CO2 SERPL-SCNC: 24.2 MMOL/L (ref 22–29)
CREAT SERPL-MCNC: 1.02 MG/DL (ref 0.57–1)
D-LACTATE SERPL-SCNC: 2 MMOL/L (ref 0.5–2)
DEPRECATED RDW RBC AUTO: 42.9 FL (ref 37–54)
EGFRCR SERPLBLD CKD-EPI 2021: 54 ML/MIN/1.73
EOSINOPHIL # BLD AUTO: 0.05 10*3/MM3 (ref 0–0.4)
EOSINOPHIL NFR BLD AUTO: 0.3 % (ref 0.3–6.2)
ERYTHROCYTE [DISTWIDTH] IN BLOOD BY AUTOMATED COUNT: 13.3 % (ref 12.3–15.4)
GLOBULIN UR ELPH-MCNC: 2.9 GM/DL
GLUCOSE SERPL-MCNC: 90 MG/DL (ref 65–99)
GRAM STN SPEC: NORMAL
HCT VFR BLD AUTO: 43.6 % (ref 34–46.6)
HGB BLD-MCNC: 14.2 G/DL (ref 12–15.9)
HOLD SPECIMEN: NORMAL
HOLD SPECIMEN: NORMAL
IMM GRANULOCYTES # BLD AUTO: 0.07 10*3/MM3 (ref 0–0.05)
IMM GRANULOCYTES NFR BLD AUTO: 0.5 % (ref 0–0.5)
L PNEUMO1 AG UR QL IA: NEGATIVE
LYMPHOCYTES # BLD AUTO: 3.6 10*3/MM3 (ref 0.7–3.1)
LYMPHOCYTES NFR BLD AUTO: 24.5 % (ref 19.6–45.3)
MCH RBC QN AUTO: 28.7 PG (ref 26.6–33)
MCHC RBC AUTO-ENTMCNC: 32.6 G/DL (ref 31.5–35.7)
MCV RBC AUTO: 88.1 FL (ref 79–97)
MONOCYTES # BLD AUTO: 1.1 10*3/MM3 (ref 0.1–0.9)
MONOCYTES NFR BLD AUTO: 7.5 % (ref 5–12)
MRSA DNA SPEC QL NAA+PROBE: NORMAL
NEUTROPHILS NFR BLD AUTO: 67.1 % (ref 42.7–76)
NEUTROPHILS NFR BLD AUTO: 9.86 10*3/MM3 (ref 1.7–7)
NRBC BLD AUTO-RTO: 0 /100 WBC (ref 0–0.2)
NT-PROBNP SERPL-MCNC: 730 PG/ML (ref 0–1800)
PLATELET # BLD AUTO: 360 10*3/MM3 (ref 140–450)
PMV BLD AUTO: 9.7 FL (ref 6–12)
POTASSIUM SERPL-SCNC: 4.2 MMOL/L (ref 3.5–5.2)
PROCALCITONIN SERPL-MCNC: 0.04 NG/ML (ref 0–0.25)
PROT SERPL-MCNC: 6.3 G/DL (ref 6–8.5)
QT INTERVAL: 346 MS
QTC INTERVAL: 400 MS
RBC # BLD AUTO: 4.95 10*6/MM3 (ref 3.77–5.28)
S PNEUM AG SPEC QL LA: NEGATIVE
SODIUM SERPL-SCNC: 137 MMOL/L (ref 136–145)
TROPONIN T SERPL HS-MCNC: 19 NG/L
WBC NRBC COR # BLD AUTO: 14.69 10*3/MM3 (ref 3.4–10.8)
WHOLE BLOOD HOLD COAG: NORMAL
WHOLE BLOOD HOLD SPECIMEN: NORMAL

## 2024-10-15 PROCEDURE — 36415 COLL VENOUS BLD VENIPUNCTURE: CPT

## 2024-10-15 PROCEDURE — 94664 DEMO&/EVAL PT USE INHALER: CPT

## 2024-10-15 PROCEDURE — G0378 HOSPITAL OBSERVATION PER HR: HCPCS

## 2024-10-15 PROCEDURE — 84145 PROCALCITONIN (PCT): CPT | Performed by: EMERGENCY MEDICINE

## 2024-10-15 PROCEDURE — 83880 ASSAY OF NATRIURETIC PEPTIDE: CPT

## 2024-10-15 PROCEDURE — 71045 X-RAY EXAM CHEST 1 VIEW: CPT

## 2024-10-15 PROCEDURE — 93010 ELECTROCARDIOGRAM REPORT: CPT | Performed by: INTERNAL MEDICINE

## 2024-10-15 PROCEDURE — 83605 ASSAY OF LACTIC ACID: CPT | Performed by: EMERGENCY MEDICINE

## 2024-10-15 PROCEDURE — 93005 ELECTROCARDIOGRAM TRACING: CPT | Performed by: EMERGENCY MEDICINE

## 2024-10-15 PROCEDURE — 94799 UNLISTED PULMONARY SVC/PX: CPT

## 2024-10-15 PROCEDURE — 25010000002 ENOXAPARIN PER 10 MG: Performed by: STUDENT IN AN ORGANIZED HEALTH CARE EDUCATION/TRAINING PROGRAM

## 2024-10-15 PROCEDURE — 99285 EMERGENCY DEPT VISIT HI MDM: CPT

## 2024-10-15 PROCEDURE — 94761 N-INVAS EAR/PLS OXIMETRY MLT: CPT

## 2024-10-15 PROCEDURE — 93005 ELECTROCARDIOGRAM TRACING: CPT

## 2024-10-15 PROCEDURE — 87449 NOS EACH ORGANISM AG IA: CPT | Performed by: STUDENT IN AN ORGANIZED HEALTH CARE EDUCATION/TRAINING PROGRAM

## 2024-10-15 PROCEDURE — 87899 AGENT NOS ASSAY W/OPTIC: CPT | Performed by: STUDENT IN AN ORGANIZED HEALTH CARE EDUCATION/TRAINING PROGRAM

## 2024-10-15 PROCEDURE — 96361 HYDRATE IV INFUSION ADD-ON: CPT

## 2024-10-15 PROCEDURE — 63710000001 PREDNISONE PER 1 MG: Performed by: STUDENT IN AN ORGANIZED HEALTH CARE EDUCATION/TRAINING PROGRAM

## 2024-10-15 PROCEDURE — 94640 AIRWAY INHALATION TREATMENT: CPT

## 2024-10-15 PROCEDURE — 80053 COMPREHEN METABOLIC PANEL: CPT

## 2024-10-15 PROCEDURE — 71275 CT ANGIOGRAPHY CHEST: CPT

## 2024-10-15 PROCEDURE — 96365 THER/PROPH/DIAG IV INF INIT: CPT

## 2024-10-15 PROCEDURE — 87205 SMEAR GRAM STAIN: CPT | Performed by: STUDENT IN AN ORGANIZED HEALTH CARE EDUCATION/TRAINING PROGRAM

## 2024-10-15 PROCEDURE — 84484 ASSAY OF TROPONIN QUANT: CPT

## 2024-10-15 PROCEDURE — 25010000002 CEFTRIAXONE PER 250 MG: Performed by: STUDENT IN AN ORGANIZED HEALTH CARE EDUCATION/TRAINING PROGRAM

## 2024-10-15 PROCEDURE — 85025 COMPLETE CBC W/AUTO DIFF WBC: CPT

## 2024-10-15 PROCEDURE — 87641 MR-STAPH DNA AMP PROBE: CPT | Performed by: STUDENT IN AN ORGANIZED HEALTH CARE EDUCATION/TRAINING PROGRAM

## 2024-10-15 PROCEDURE — 96372 THER/PROPH/DIAG INJ SC/IM: CPT

## 2024-10-15 PROCEDURE — 25510000001 IOPAMIDOL PER 1 ML: Performed by: STUDENT IN AN ORGANIZED HEALTH CARE EDUCATION/TRAINING PROGRAM

## 2024-10-15 PROCEDURE — 94760 N-INVAS EAR/PLS OXIMETRY 1: CPT

## 2024-10-15 PROCEDURE — 25810000003 LACTATED RINGERS PER 1000 ML: Performed by: STUDENT IN AN ORGANIZED HEALTH CARE EDUCATION/TRAINING PROGRAM

## 2024-10-15 RX ORDER — POLYETHYLENE GLYCOL 3350 17 G/17G
17 POWDER, FOR SOLUTION ORAL DAILY PRN
Status: DISCONTINUED | OUTPATIENT
Start: 2024-10-15 | End: 2024-10-18 | Stop reason: HOSPADM

## 2024-10-15 RX ORDER — DOXAZOSIN 1 MG/1
1 TABLET ORAL NIGHTLY
COMMUNITY
End: 2024-10-18 | Stop reason: HOSPADM

## 2024-10-15 RX ORDER — AMOXICILLIN 250 MG
2 CAPSULE ORAL 2 TIMES DAILY
Status: DISCONTINUED | OUTPATIENT
Start: 2024-10-15 | End: 2024-10-18 | Stop reason: HOSPADM

## 2024-10-15 RX ORDER — BUDESONIDE 1 MG/2ML
1 INHALANT ORAL
Status: DISCONTINUED | OUTPATIENT
Start: 2024-10-15 | End: 2024-10-18 | Stop reason: HOSPADM

## 2024-10-15 RX ORDER — SODIUM CHLORIDE, SODIUM LACTATE, POTASSIUM CHLORIDE, CALCIUM CHLORIDE 600; 310; 30; 20 MG/100ML; MG/100ML; MG/100ML; MG/100ML
75 INJECTION, SOLUTION INTRAVENOUS CONTINUOUS
Status: ACTIVE | OUTPATIENT
Start: 2024-10-15 | End: 2024-10-15

## 2024-10-15 RX ORDER — ALBUTEROL SULFATE 0.83 MG/ML
2.5 SOLUTION RESPIRATORY (INHALATION) EVERY 6 HOURS PRN
Status: DISCONTINUED | OUTPATIENT
Start: 2024-10-15 | End: 2024-10-18 | Stop reason: HOSPADM

## 2024-10-15 RX ORDER — ENOXAPARIN SODIUM 100 MG/ML
40 INJECTION SUBCUTANEOUS NIGHTLY
Status: DISCONTINUED | OUTPATIENT
Start: 2024-10-15 | End: 2024-10-18 | Stop reason: HOSPADM

## 2024-10-15 RX ORDER — SODIUM CHLORIDE 0.9 % (FLUSH) 0.9 %
10 SYRINGE (ML) INJECTION AS NEEDED
Status: DISCONTINUED | OUTPATIENT
Start: 2024-10-15 | End: 2024-10-18 | Stop reason: HOSPADM

## 2024-10-15 RX ORDER — PREDNISONE 20 MG/1
40 TABLET ORAL
Status: DISCONTINUED | OUTPATIENT
Start: 2024-10-15 | End: 2024-10-18 | Stop reason: HOSPADM

## 2024-10-15 RX ORDER — AZITHROMYCIN 250 MG/1
500 TABLET, FILM COATED ORAL
Status: DISCONTINUED | OUTPATIENT
Start: 2024-10-15 | End: 2024-10-18 | Stop reason: HOSPADM

## 2024-10-15 RX ORDER — CEFDINIR 300 MG/1
300 CAPSULE ORAL EVERY 12 HOURS SCHEDULED
Status: DISCONTINUED | OUTPATIENT
Start: 2024-10-15 | End: 2024-10-15

## 2024-10-15 RX ORDER — PANTOPRAZOLE SODIUM 40 MG/1
40 TABLET, DELAYED RELEASE ORAL
Status: DISCONTINUED | OUTPATIENT
Start: 2024-10-15 | End: 2024-10-18 | Stop reason: HOSPADM

## 2024-10-15 RX ORDER — IPRATROPIUM BROMIDE AND ALBUTEROL SULFATE 2.5; .5 MG/3ML; MG/3ML
3 SOLUTION RESPIRATORY (INHALATION) ONCE
Status: COMPLETED | OUTPATIENT
Start: 2024-10-15 | End: 2024-10-15

## 2024-10-15 RX ORDER — BISACODYL 5 MG/1
5 TABLET, DELAYED RELEASE ORAL DAILY PRN
Status: DISCONTINUED | OUTPATIENT
Start: 2024-10-15 | End: 2024-10-18 | Stop reason: HOSPADM

## 2024-10-15 RX ORDER — GUAIFENESIN 600 MG/1
1200 TABLET, EXTENDED RELEASE ORAL EVERY 12 HOURS SCHEDULED
Status: DISCONTINUED | OUTPATIENT
Start: 2024-10-15 | End: 2024-10-18 | Stop reason: HOSPADM

## 2024-10-15 RX ORDER — IOPAMIDOL 755 MG/ML
100 INJECTION, SOLUTION INTRAVASCULAR
Status: COMPLETED | OUTPATIENT
Start: 2024-10-15 | End: 2024-10-15

## 2024-10-15 RX ORDER — BISACODYL 10 MG
10 SUPPOSITORY, RECTAL RECTAL DAILY PRN
Status: DISCONTINUED | OUTPATIENT
Start: 2024-10-15 | End: 2024-10-18 | Stop reason: HOSPADM

## 2024-10-15 RX ORDER — CHOLESTYRAMINE 4 G/9G
1 POWDER, FOR SUSPENSION ORAL DAILY
Status: ON HOLD | COMMUNITY
End: 2024-10-15

## 2024-10-15 RX ORDER — NITROGLYCERIN 0.4 MG/1
0.4 TABLET SUBLINGUAL
Status: DISCONTINUED | OUTPATIENT
Start: 2024-10-15 | End: 2024-10-18 | Stop reason: HOSPADM

## 2024-10-15 RX ORDER — SODIUM CHLORIDE 0.9 % (FLUSH) 0.9 %
10 SYRINGE (ML) INJECTION EVERY 12 HOURS SCHEDULED
Status: DISCONTINUED | OUTPATIENT
Start: 2024-10-15 | End: 2024-10-18 | Stop reason: HOSPADM

## 2024-10-15 RX ORDER — IPRATROPIUM BROMIDE AND ALBUTEROL SULFATE 2.5; .5 MG/3ML; MG/3ML
3 SOLUTION RESPIRATORY (INHALATION)
Status: DISCONTINUED | OUTPATIENT
Start: 2024-10-15 | End: 2024-10-18 | Stop reason: HOSPADM

## 2024-10-15 RX ADMIN — SENNOSIDES AND DOCUSATE SODIUM 2 TABLET: 50; 8.6 TABLET ORAL at 21:31

## 2024-10-15 RX ADMIN — IPRATROPIUM BROMIDE AND ALBUTEROL SULFATE 3 ML: 2.5; .5 SOLUTION RESPIRATORY (INHALATION) at 21:13

## 2024-10-15 RX ADMIN — GUAIFENESIN 1200 MG: 600 TABLET, EXTENDED RELEASE ORAL at 21:31

## 2024-10-15 RX ADMIN — Medication 10 ML: at 21:32

## 2024-10-15 RX ADMIN — ENOXAPARIN SODIUM 40 MG: 100 INJECTION SUBCUTANEOUS at 21:31

## 2024-10-15 RX ADMIN — AZITHROMYCIN 500 MG: 250 TABLET, FILM COATED ORAL at 20:44

## 2024-10-15 RX ADMIN — CEFTRIAXONE SODIUM 1000 MG: 1 INJECTION, POWDER, FOR SOLUTION INTRAMUSCULAR; INTRAVENOUS at 19:44

## 2024-10-15 RX ADMIN — BUDESONIDE 1 MG: 1 INHALANT ORAL at 21:14

## 2024-10-15 RX ADMIN — SODIUM CHLORIDE, POTASSIUM CHLORIDE, SODIUM LACTATE AND CALCIUM CHLORIDE 75 ML/HR: 600; 310; 30; 20 INJECTION, SOLUTION INTRAVENOUS at 13:39

## 2024-10-15 RX ADMIN — PREDNISONE 40 MG: 20 TABLET ORAL at 20:44

## 2024-10-15 RX ADMIN — IPRATROPIUM BROMIDE AND ALBUTEROL SULFATE 3 ML: .5; 3 SOLUTION RESPIRATORY (INHALATION) at 12:15

## 2024-10-15 RX ADMIN — IPRATROPIUM BROMIDE AND ALBUTEROL SULFATE 3 ML: 2.5; .5 SOLUTION RESPIRATORY (INHALATION) at 15:11

## 2024-10-15 RX ADMIN — PANTOPRAZOLE SODIUM 40 MG: 40 TABLET, DELAYED RELEASE ORAL at 19:45

## 2024-10-15 RX ADMIN — IOPAMIDOL 95 ML: 755 INJECTION, SOLUTION INTRAVENOUS at 15:33

## 2024-10-15 NOTE — TELEPHONE ENCOUNTER
She was d/c [facility] was diagnosed with  pneumonia,  gave her albuterol  inhaler,as needed, was getting breathing tx at hospital. She could not breath well, last night all night and coughing up a lot, did not  sleep well.  I had nebulizer and albuterol tx from past of mine and  used it for her., Can we get prescription for albuterol? She is breathing better this morning with breathing tx. Told her will have to be seen again for new prescriptions . Mother is sob and could not even walk to the car to meet the daughter earlier. Triage done. Sent back to ER for evaluation,   Reason for Disposition   [1] MODERATE difficulty breathing (e.g., speaks in phrases, SOB even at rest, pulse 100-120) AND [2] NEW-onset or WORSE than normal    Additional Information   Negative: SEVERE difficulty breathing (e.g., struggling for each breath, speaks in single words)   Negative: [1] Breathing stopped AND [2] hasn't returned   Negative: Choking on something   Negative: Bluish (or gray) lips or face now   Negative: Difficult to awaken or acting confused (e.g., disoriented, slurred speech)   Negative: Passed out (i.e., lost consciousness, collapsed and was not responding)   Negative: Wheezing started suddenly after medicine, an allergic food or bee sting   Negative: Stridor (harsh sound while breathing in)   Negative: Slow, shallow and weak breathing   Negative: Sounds like a life-threatening emergency to the triager   Negative: Chest pain   Negative: [1] Wheezing (high pitched whistling sound) AND [2] previous asthma attacks or use of asthma medicines   Negative: [1] Difficulty breathing AND [2] only present when coughing   Negative: [1] Difficulty breathing AND [2] only from stuffy or runny nose   Negative: [1] Difficulty breathing AND [2] within 14 days of COVID-19 Exposure   Negative: Oxygen level (e.g., pulse oximetry) 90 percent or lower   Negative: Wheezing can be heard across the room    Answer Assessment - Initial  "Assessment Questions  1. RESPIRATORY STATUS: \"Describe your breathing?\" (e.g., wheezing, shortness of breath, unable to speak, severe coughing)       Sob coughing congested  2. ONSET: \"When did this breathing problem begin?\"       Before discharge   3. PATTERN \"Does the difficult breathing come and go, or has it been constant since it started?\"       Constant today  4. SEVERITY: \"How bad is your breathing?\" (e.g., mild, moderate, severe)     - MILD: No SOB at rest, mild SOB with walking, speaks normally in sentences, can lie down, no retractions, pulse < 100.     - MODERATE: SOB at rest, SOB with minimal exertion and prefers to sit, cannot lie down flat, speaks in phrases, mild retractions, audible wheezing, pulse 100-120.     - SEVERE: Very SOB at rest, speaks in single words, struggling to breathe, sitting hunched forward, retractions, pulse > 120       moderate  5. RECURRENT SYMPTOM: \"Have you had difficulty breathing before?\" If Yes, ask: \"When was the last time?\" and \"What happened that time?\"       Yes and has asthma also with pneumonia  6. CARDIAC HISTORY: \"Do you have any history of heart disease?\" (e.g., heart attack, angina, bypass surgery, angioplasty)       no  7. LUNG HISTORY: \"Do you have any history of lung disease?\"  (e.g., pulmonary embolus, asthma, emphysema)      asthma  8. CAUSE: \"What do you think is causing the breathing problem?\"       pneumonia  9. OTHER SYMPTOMS: \"Do you have any other symptoms? (e.g., dizziness, runny nose, cough, chest pain, fever)      Cough, congestion  10. O2 SATURATION MONITOR:  \"Do you use an oxygen saturation monitor (pulse oximeter) at home?\" If Yes, ask: \"What is your reading (oxygen level) today?\" \"What is your usual oxygen saturation reading?\" (e.g., 95%)        Not checked   11. PREGNANCY: \"Is there any chance you are pregnant?\" \"When was your last menstrual period?\"        no  12. TRAVEL: \"Have you traveled out of the country in the last month?\" (e.g., travel " history, exposures)        no    Protocols used: Breathing Difficulty-ADULT-AH
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
## 2024-10-15 NOTE — PROGRESS NOTES
"[facility] Clinical Pharmacy Services: Enoxaparin Consult    Sisi Francisco has a pharmacy consult to dose prophylactic enoxaparin per Dr. Ruiz's request.     Indication: VTE Prophylaxis  Home Anticoagulation: none     Relevant clinical data and objective history reviewed:  84 y.o. female 167.6 cm (66\") 79 kg (174 lb 3.2 oz)   Body mass index is 28.12 kg/m².   Results from last 7 days   Lab Units 10/15/24  1021   PLATELETS 10*3/mm3 360     Estimated Creatinine Clearance: 42.8 mL/min (A) (by C-G formula based on SCr of 1.02 mg/dL (H)).    Assessment/Plan    Will start patient on 40mg subcutaneous every 24 hours, adjusted for renal function. Consult order will be discontinued but pharmacy will continue to follow.     Priscilla Powers, [facility]  Clinical Pharmacist   " History  Chief Complaint   Patient presents with    Bleeding/Bruising     Pt who is on plavix c/o acute onset bruising RLE on Tuesday - getting larger in size and concerned thathe feels it is warm to touch and inside     50-year-old male with spouse complains of right leg bruising pain ongoing for three days, remembers bumping with subacute onset bruising, swelling, radiation up leg without numbness or weakness  No fever or chills  No wound  Has been applying topical antibiotic, but no wound  No numbness or tingling  No weakness or change in gait  No other injury  No other bleeding  Denies headache, chest pain, hemoptysis  States he is a diabetic and concerned about infection  Currently taking clopidogrel and aspirin      Medical Problem  Location:  Right distal lateral leg  Quality:  Ache  Severity:  Moderate  Onset quality:  Gradual  Timing:  Constant  Progression:  Unchanged  Chronicity:  New  Context:  Bumped, bruised, swollen  Associated symptoms: no abdominal pain, no chest pain, no fever, no nausea, no shortness of breath and no vomiting        Prior to Admission Medications   Prescriptions Last Dose Informant Patient Reported? Taking?    ASPIRIN 81 PO   Yes No   Sig: Take 81 mg by mouth daily   Omega-3 Fatty Acids (fish oil) 1,000 mg   Yes Yes   Sig: Take 1,000 mg by mouth daily   amLODIPine (NORVASC) 5 mg tablet   Yes Yes   Sig: Take 5 mg by mouth daily   clopidogrel (PLAVIX) 75 mg tablet   Yes Yes   Sig: Take 75 mg by mouth daily   co-enzyme Q-10 50 MG capsule   Yes Yes   Sig: Take 100 mg by mouth daily   glucosamine-chondroitin 500-400 MG tablet   Yes Yes   Sig: Take 1 tablet by mouth 2 (two) times a day   metFORMIN (GLUCOPHAGE) 500 mg tablet   Yes Yes   Sig: Take 750 mg by mouth daily with breakfast   metoprolol tartrate (LOPRESSOR) 25 mg tablet   Yes Yes   Sig: Take 12 5 mg by mouth daily   pantoprazole (PROTONIX) 40 mg tablet   Yes Yes   Sig: Take 40 mg by mouth daily   pregabalin (LYRICA) 50 mg capsule   Yes Yes   Sig: Take 50 mg by mouth 2 (two) times a day   rosuvastatin (CRESTOR) 20 MG tablet   Yes Yes   Sig: Take 20 mg by mouth daily   sucralfate (CARAFATE) 1 g tablet   Yes Yes   Sig: Take 1 g by mouth 2 (two) times a day      Facility-Administered Medications: None       Past Medical History:   Diagnosis Date    Diabetes mellitus ([address] )     Hx of heart artery stent     x 18    Hypertension     Renal disorder     1 functioning kidney       Past Surgical History:   Procedure Laterality Date    BACK SURGERY      CAROTID STENT      CHOLECYSTECTOMY      EAR SURGERY      EYE SURGERY Bilateral     cataract       History reviewed  No pertinent family history  I have reviewed and agree with the history as documented  E-Cigarette/Vaping     E-Cigarette/Vaping Substances     Social History     Tobacco Use    Smoking status: Former Smoker    Smokeless tobacco: Never Used   Substance Use Topics    Alcohol use: Not Currently    Drug use: Not Currently       Review of Systems   Constitutional: Negative for fever  Respiratory: Negative for shortness of breath  Cardiovascular: Negative for chest pain  Gastrointestinal: Negative for abdominal pain, nausea and vomiting  All other systems reviewed and are negative  Physical Exam  Physical Exam  Vitals signs and nursing note reviewed  Constitutional:       Comments: Pleasant, comfortable-appearing   HENT:      Head: Normocephalic and atraumatic  Eyes:      Conjunctiva/sclera: Conjunctivae normal       Pupils: Pupils are equal, round, and reactive to light  Neck:      Musculoskeletal: Neck supple  Cardiovascular:      Rate and Rhythm: Normal rate and regular rhythm  Heart sounds: Normal heart sounds  Pulmonary:      Effort: Pulmonary effort is normal       Breath sounds: Normal breath sounds  Abdominal:      General: Bowel sounds are normal  There is no distension  Palpations: Abdomen is soft  Tenderness:  There is no abdominal tenderness  Musculoskeletal: Normal range of motion  Skin:     General: Skin is warm and dry  Neurological:      Mental Status: He is alert and oriented to person, place, and time  Cranial Nerves: No cranial nerve deficit  Coordination: Coordination normal    Psychiatric:         Behavior: Behavior normal          Thought Content:  Thought content normal          Judgment: Judgment normal              Vital Signs  ED Triage Vitals [05/21/21 1147]   Temperature Pulse Respirations Blood Pressure SpO2   (!) 97 °F (36 1 °C) 61 18 138/88 (!) 9 %      Temp Source Heart Rate Source Patient Position - Orthostatic VS BP Location FiO2 (%)   Temporal Monitor Sitting Right arm --      Pain Score       2           Vitals:    05/21/21 1147 05/21/21 1405   BP: 138/88 165/84   Pulse: 61 (!) 51   Patient Position - Orthostatic VS: Sitting Lying         Visual Acuity      ED Medications  Medications - No data to display    Diagnostic Studies  Results Reviewed     Procedure Component Value Units Date/Time    Protime-INR [[id number]]  (Normal) Collected: 05/21/21 1354    Lab Status: Final result Specimen: Blood Updated: 05/21/21 1408     Protime 13 9 seconds      INR 1 09    APTT [[id number]]  (Normal) Collected: 05/21/21 1354    Lab Status: Final result Specimen: Blood Updated: 05/21/21 1408     PTT 26 seconds     Basic metabolic panel [[id number]]  (Abnormal) Collected: 05/21/21 1354    Lab Status: Final result Specimen: Blood Updated: 05/21/21 1407     Sodium 138 mmol/L      Potassium 4 7 mmol/L      Chloride 104 mmol/L      CO2 32 mmol/L      ANION GAP 2 mmol/L      BUN 16 mg/dL      Creatinine 1 25 mg/dL      Glucose 101 mg/dL      Calcium 8 7 mg/dL      eGFR 54 ml/min/1 73sq m     Narrative:      [place] guidelines for Chronic Kidney Disease (CKD):     Stage 1 with normal or high GFR (GFR > 90 mL/min/1 73 square meters)    Stage 2 Mild CKD (GFR = 60-89 mL/min/1 73 square meters)    Stage 3A Moderate CKD (GFR = 45-59 mL/min/1 73 square meters)    Stage 3B Moderate CKD (GFR = 30-44 mL/min/1 73 square meters)    Stage 4 Severe CKD (GFR = 15-29 mL/min/1 73 square meters)    Stage 5 End Stage CKD (GFR <15 mL/min/1 73 square meters)  Note: GFR calculation is accurate only with a steady state creatinine    CBC and differential [[id number]]  (Abnormal) Collected: 05/21/21 1359    Lab Status: Final result Specimen: Blood Updated: 05/21/21 1357     WBC 9 45 Thousand/uL      RBC 4 44 Million/uL      Hemoglobin 12 8 g/dL      Hematocrit 40 4 %      MCV 91 fL      MCH 28 8 pg      MCHC 31 7 g/dL      RDW 12 9 %      MPV 10 1 fL      Platelets 543 Thousands/uL      nRBC 0 /100 WBCs      Neutrophils Relative 71 %      Immat GRANS % 1 %      Lymphocytes Relative 19 %      Monocytes Relative 8 %      Eosinophils Relative 1 %      Basophils Relative 0 %      Neutrophils Absolute 6 77 Thousands/µL      Immature Grans Absolute 0 05 Thousand/uL      Lymphocytes Absolute 1 79 Thousands/µL      Monocytes Absolute 0 75 Thousand/µL      Eosinophils Absolute 0 05 Thousand/µL      Basophils Absolute 0 04 Thousands/µL                  XR tibia fibula 2 views RIGHT   ED Interpretation by Loreta Craig DO (05/21 1407)   No fracture                 Procedures  Procedures         ED Course  ED Course as of May 21 1630   Fri May 21, 2021   1406 Platelet Count(!): 851   1426 We discussed results and agree with close outpatient follow-up, wife present                                              MDM    Disposition  Final diagnoses:   Hematoma     Time reflects when diagnosis was documented in both MDM as applicable and the Disposition within this note     Time User Action Codes Description Comment    5/21/2021  2:23 PM Nneka, [address]  8XXA] Hematoma       ED Disposition     ED Disposition Condition Date/Time Comment    Discharge Stable Fri May 21, 2021  2:23 PM [place] discharge to home/self care             Follow-up Information     Follow up With Specialties Details Why Contact Info    Kendall Herron DO General Practice Schedule an appointment as soon as possible for a visit in 1 week  Diane Louie  [phone]            Discharge Medication List as of 5/21/2021  2:26 PM      CONTINUE these medications which have NOT CHANGED    Details   amLODIPine (NORVASC) 5 mg tablet Take 5 mg by mouth daily, Starting Tue 4/20/2021, Historical Med      clopidogrel (PLAVIX) 75 mg tablet Take 75 mg by mouth daily, Historical Med      co-enzyme Q-10 50 MG capsule Take 100 mg by mouth daily, Historical Med      glucosamine-chondroitin 500-400 MG tablet Take 1 tablet by mouth 2 (two) times a day, Historical Med      metFORMIN (GLUCOPHAGE) 500 mg tablet Take 750 mg by mouth daily with breakfast, Historical Med      metoprolol tartrate (LOPRESSOR) 25 mg tablet Take 12 5 mg by mouth daily, Historical Med      Omega-3 Fatty Acids (fish oil) 1,000 mg Take 1,000 mg by mouth daily, Historical Med      pantoprazole (PROTONIX) 40 mg tablet Take 40 mg by mouth daily, Historical Med      pregabalin (LYRICA) 50 mg capsule Take 50 mg by mouth 2 (two) times a day, Historical Med      rosuvastatin (CRESTOR) 20 MG tablet Take 20 mg by mouth daily, Historical Med      sucralfate (CARAFATE) 1 g tablet Take 1 g by mouth 2 (two) times a day, Historical Med      ASPIRIN 81 PO Take 81 mg by mouth daily, Historical Med           No discharge procedures on file      PDMP Review     None          ED Provider  Electronically Signed by           Barbara Cardenas DO  05/21/21 9466

## 2024-10-15 NOTE — ED NOTES
Nursing report ED to floor  Sisi Francisco  84 y.o.  female    HPI :  HPI  Stated Reason for Visit: from home with c/o SOA.  dx with pna 3 days ago.  History Obtained From: patient    Chief Complaint  Chief Complaint   Patient presents with    Shortness of Breath    Cough       Admitting doctor:   Jean Carlos Ruiz MD    Admitting diagnosis:   The primary encounter diagnosis was Dyspnea, unspecified type. Diagnoses of Hypoxia, History of pneumonia, and History of asthma were also pertinent to this visit.    Code status:   Current Code Status       Date Active Code Status Order ID Comments User Context       Prior            Allergies:   Amlodipine besylate, Lisinopril, Losartan, and Simvastatin    Isolation:   No active isolations    Intake and Output  No intake or output data in the 24 hours ending 10/15/24 1214    Weight:   There were no vitals filed for this visit.    Most recent vitals:   Vitals:    10/15/24 1006 10/15/24 1007 10/15/24 1020 10/15/24 1131   BP:   (!) 127/104 142/72   BP Location:   Left arm    Patient Position:   Sitting    Pulse:  72  68   Resp:   20    Temp: 97.9 °F (36.6 °C)      TempSrc: Tympanic      SpO2:   94% 97%       Active LDAs/IV Access:   Lines, Drains & Airways       Active LDAs       None                    Labs (abnormal labs have a star):   Labs Reviewed   COMPREHENSIVE METABOLIC PANEL - Abnormal; Notable for the following components:       Result Value    Creatinine 1.02 (*)     Albumin 3.4 (*)     Alkaline Phosphatase 118 (*)     eGFR 54.0 (*)     All other components within normal limits    Narrative:     GFR Normal >60  Chronic Kidney Disease <60  Kidney Failure <15    The GFR formula is only valid for adults with stable renal function between ages 18 and 70.   SINGLE HS TROPONIN T - Abnormal; Notable for the following components:    HS Troponin T 19 (*)     All other components within normal limits    Narrative:     High Sensitive Troponin T Reference Range:  <14.0 ng/L-  Negative Female for AMI  <22.0 ng/L- Negative Male for AMI  >=14 - Abnormal Female indicating possible myocardial injury.  >=22 - Abnormal Male indicating possible myocardial injury.   Clinicians would have to utilize clinical acumen, EKG, Troponin, and serial changes to determine if it is an Acute Myocardial Infarction or myocardial injury due to an underlying chronic condition.        CBC WITH AUTO DIFFERENTIAL - Abnormal; Notable for the following components:    WBC 14.69 (*)     Neutrophils, Absolute 9.86 (*)     Lymphocytes, Absolute 3.60 (*)     Monocytes, Absolute 1.10 (*)     Immature Grans, Absolute 0.07 (*)     All other components within normal limits   BNP (IN-HOUSE) - Normal    Narrative:     This assay is used as an aid in the diagnosis of individuals suspected of having heart failure. It can be used as an aid in the diagnosis of acute decompensated heart failure (ADHF) in patients presenting with signs and symptoms of ADHF to the emergency department (ED). In addition, NT-proBNP of <300 pg/mL indicates ADHF is not likely.    Age Range Result Interpretation  NT-proBNP Concentration (pg/mL:      <50             Positive            >450                   Gray                 300-450                    Negative             <300    50-75           Positive            >900                  Gray                300-900                  Negative            <300      >75             Positive            >1800                  Gray                300-1800                  Negative            <300   PROCALCITONIN - Normal    Narrative:     As a Marker for Sepsis (Non-Neonates):    1. <0.5 ng/mL represents a low risk of severe sepsis and/or septic shock.  2. >2 ng/mL represents a high risk of severe sepsis and/or septic shock.    As a Marker for Lower Respiratory Tract Infections that require antibiotic therapy:    PCT on Admission    Antibiotic Therapy       6-12 Hrs later    >0.5                Strongly  "Recommended  >0.25 - <0.5        Recommended   0.1 - 0.25          Discouraged              Remeasure/reassess PCT  <0.1                Strongly Discouraged     Remeasure/reassess PCT    As 28 day mortality risk marker: \"Change in Procalcitonin Result\" (>80% or <=80%) if Day 0 (or Day 1) and Day 4 values are available. Refer to http://www.[facility]-pct-calculator.com    Change in PCT <=80%  A decrease of PCT levels below or equal to 80% defines a positive change in PCT test result representing a higher risk for 28-day all-cause mortality of patients diagnosed with severe sepsis for septic shock.    Change in PCT >80%  A decrease of PCT levels of more than 80% defines a negative change in PCT result representing a lower risk for 28-day all-cause mortality of patients diagnosed with severe sepsis or septic shock.      RAINBOW DRAW    Narrative:     The following orders were created for panel order [place] Draw.  Procedure                               Abnormality         Status                     ---------                               -----------         ------                     Green Top (Gel)[[id number]]                                  Final result               Lavender Top[[id number]]                                     Final result               Gold Top - SST[[id number]]                                   Final result               Light Blue Top[[id number]]                                   Final result                 Please view results for these tests on the individual orders.   LACTIC ACID, PLASMA   CBC AND DIFFERENTIAL    Narrative:     The following orders were created for panel order CBC & Differential.  Procedure                               Abnormality         Status                     ---------                               -----------         ------                     CBC Auto Differential[[id number]]        Abnormal            Final result                 Please view results for these tests on the " individual orders.   GREEN TOP   LAVENDER TOP   GOLD TOP - SST   LIGHT BLUE TOP       EKG:   ECG 12 Lead ED Triage Standing Order; SOA   Final Result   HEART RATE=80  bpm   RR Oambcprd=848  ms   MO Mcezmuys=692  ms   P Horizontal Axis=-24  deg   P Front Axis=111  deg   QRSD Interval=89  ms   QT Twjcyusb=589  ms   ORnA=487  ms   QRS Axis=184  deg   T Wave Axis=84  deg   - ABNORMAL ECG -   Poor quality tracing, repeat.   Right arm and left leg electrode reversal   Electronically Signed By: Ector JimenezWILLY) ([facility]) 2024-10-15 10:40:41   Date and Time of Study:2024-10-15 10:17:42          Meds given in ED:   Medications   sodium chloride 0.9 % flush 10 mL (has no administration in time range)   ipratropium-albuterol (DUO-NEB) nebulizer solution 3 mL (has no administration in time range)       Imaging results:  XR Chest 1 View    Result Date: 10/15/2024  1. Borderline cardiomegaly. 2. Lungs appear clear.   This report was finalized on 10/15/2024 11:09 AM by Dr. Kush Cruz M.D on Workstation: [id number]       Ambulatory status:   - ambulates with walker    Social issues:   Social History     Socioeconomic History    Marital status:    Tobacco Use    Smoking status: Never    Smokeless tobacco: Never   Vaping Use    Vaping status: Never Used   Substance and Sexual Activity    Alcohol use: Never    Drug use: Never    Sexual activity: Not Currently       Peripheral Neurovascular  Peripheral Neurovascular (Adult)  Peripheral Neurovascular WDL: WDL    Neuro Cognitive  Neuro Cognitive (Adult)  Cognitive/Neuro/Behavioral WDL: WDL    Learning       Respiratory  Respiratory WDL  Respiratory WDL: .WDL except, rhythm/pattern, cough  Rhythm/Pattern, Respiratory: shortness of breath  Cough Frequency: frequent  Cough Type: productive, nonproductive    Abdominal Pain       Pain Assessments  Pain (Adult)  (0-10) Pain Rating: Rest: 0    NIH Stroke Scale       Mere Johnson RN  10/15/24 12:14 EDT

## 2024-10-15 NOTE — PROGRESS NOTES
Clinical Pharmacy Services: Medication History    Sisi Francisco is a 84 y.o. female presenting to [facility] for   Chief Complaint   Patient presents with    Shortness of Breath    Cough       She  has no past medical history on file.    Allergies as of 10/15/2024 - Reviewed 10/15/2024   Allergen Reaction Noted    Amlodipine besylate Dizziness 10/12/2024    Lisinopril Swelling 10/12/2024    Losartan Angioedema 10/12/2024    Simvastatin Myalgia 10/12/2024       Medication information was obtained from: Patient   Pharmacy and Phone Number:     Prior to Admission Medications       Prescriptions Last Dose Informant Patient Reported? Taking?    albuterol sulfate  (90 Base) MCG/ACT inhaler  Pharmacy No Yes    Inhale 2 puffs Every 4 (Four) Hours As Needed for Wheezing.    Aspirin 325 MG capsule  Self Yes Yes    Take 1 tablet by mouth As Needed.    cefdinir (OMNICEF) 300 MG capsule 10/15/2024 Pharmacy No Yes    Take 1 capsule by mouth Every 12 (Twelve) Hours for 10 doses.    Patient taking differently:  Take 1 capsule by mouth Every 12 (Twelve) Hours. Ends 10/18/24  Indications: Pneumonia    cholestyramine (QUESTRAN) 4 g packet  Pharmacy Yes Yes    Take 1 packet by mouth Daily.    doxazosin (CARDURA) 1 MG tablet Not Taking Self Yes No    Take 1 tablet by mouth Every Night.    Patient not taking:  Reported on 10/15/2024              Medication notes: Patient states she has not been taking her doxazosin for the past 3 weeks because her blood pressure has been good.     This medication list is complete to the best of my knowledge as of 10/15/2024    Please call if questions.    Isaias Deleon  Medication History Technician  [phone]    10/15/2024 12:50 EDT

## 2024-10-15 NOTE — PLAN OF CARE
Goal Outcome Evaluation:  Patient alert and oriented, VSS, on 1.5L NC. Denies pain. Administered for hypoxia. CT angio completed and chest XR. Pulmonology consulted. Daughter at bedside.
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
## 2024-10-16 LAB
ANION GAP SERPL CALCULATED.3IONS-SCNC: 8.1 MMOL/L (ref 5–15)
BUN SERPL-MCNC: 13 MG/DL (ref 8–23)
BUN/CREAT SERPL: 14.6 (ref 7–25)
CALCIUM SPEC-SCNC: 9.3 MG/DL (ref 8.6–10.5)
CHLORIDE SERPL-SCNC: 105 MMOL/L (ref 98–107)
CO2 SERPL-SCNC: 23.9 MMOL/L (ref 22–29)
CREAT SERPL-MCNC: 0.89 MG/DL (ref 0.57–1)
DEPRECATED RDW RBC AUTO: 43.8 FL (ref 37–54)
EGFRCR SERPLBLD CKD-EPI 2021: 63.6 ML/MIN/1.73
ERYTHROCYTE [DISTWIDTH] IN BLOOD BY AUTOMATED COUNT: 13.3 % (ref 12.3–15.4)
GLUCOSE SERPL-MCNC: 153 MG/DL (ref 65–99)
HCT VFR BLD AUTO: 40.1 % (ref 34–46.6)
HGB BLD-MCNC: 13.3 G/DL (ref 12–15.9)
MAGNESIUM SERPL-MCNC: 2.3 MG/DL (ref 1.6–2.4)
MCH RBC QN AUTO: 29.4 PG (ref 26.6–33)
MCHC RBC AUTO-ENTMCNC: 33.2 G/DL (ref 31.5–35.7)
MCV RBC AUTO: 88.7 FL (ref 79–97)
PHOSPHATE SERPL-MCNC: 3.7 MG/DL (ref 2.5–4.5)
PLATELET # BLD AUTO: 314 10*3/MM3 (ref 140–450)
PMV BLD AUTO: 10.1 FL (ref 6–12)
POTASSIUM SERPL-SCNC: 4.9 MMOL/L (ref 3.5–5.2)
RBC # BLD AUTO: 4.52 10*6/MM3 (ref 3.77–5.28)
SODIUM SERPL-SCNC: 137 MMOL/L (ref 136–145)
WBC NRBC COR # BLD AUTO: 9.36 10*3/MM3 (ref 3.4–10.8)

## 2024-10-16 PROCEDURE — 63710000001 PREDNISONE PER 1 MG: Performed by: STUDENT IN AN ORGANIZED HEALTH CARE EDUCATION/TRAINING PROGRAM

## 2024-10-16 PROCEDURE — 83735 ASSAY OF MAGNESIUM: CPT | Performed by: STUDENT IN AN ORGANIZED HEALTH CARE EDUCATION/TRAINING PROGRAM

## 2024-10-16 PROCEDURE — 94799 UNLISTED PULMONARY SVC/PX: CPT

## 2024-10-16 PROCEDURE — 96366 THER/PROPH/DIAG IV INF ADDON: CPT

## 2024-10-16 PROCEDURE — 84100 ASSAY OF PHOSPHORUS: CPT | Performed by: STUDENT IN AN ORGANIZED HEALTH CARE EDUCATION/TRAINING PROGRAM

## 2024-10-16 PROCEDURE — G0378 HOSPITAL OBSERVATION PER HR: HCPCS

## 2024-10-16 PROCEDURE — 97530 THERAPEUTIC ACTIVITIES: CPT

## 2024-10-16 PROCEDURE — 85027 COMPLETE CBC AUTOMATED: CPT | Performed by: STUDENT IN AN ORGANIZED HEALTH CARE EDUCATION/TRAINING PROGRAM

## 2024-10-16 PROCEDURE — 80048 BASIC METABOLIC PNL TOTAL CA: CPT | Performed by: STUDENT IN AN ORGANIZED HEALTH CARE EDUCATION/TRAINING PROGRAM

## 2024-10-16 PROCEDURE — 94761 N-INVAS EAR/PLS OXIMETRY MLT: CPT

## 2024-10-16 PROCEDURE — 96372 THER/PROPH/DIAG INJ SC/IM: CPT

## 2024-10-16 PROCEDURE — 25010000002 ENOXAPARIN PER 10 MG: Performed by: STUDENT IN AN ORGANIZED HEALTH CARE EDUCATION/TRAINING PROGRAM

## 2024-10-16 PROCEDURE — 94664 DEMO&/EVAL PT USE INHALER: CPT

## 2024-10-16 PROCEDURE — 96361 HYDRATE IV INFUSION ADD-ON: CPT

## 2024-10-16 PROCEDURE — 94760 N-INVAS EAR/PLS OXIMETRY 1: CPT

## 2024-10-16 PROCEDURE — 25010000002 CEFTRIAXONE PER 250 MG: Performed by: STUDENT IN AN ORGANIZED HEALTH CARE EDUCATION/TRAINING PROGRAM

## 2024-10-16 PROCEDURE — 97165 OT EVAL LOW COMPLEX 30 MIN: CPT

## 2024-10-16 PROCEDURE — 97161 PT EVAL LOW COMPLEX 20 MIN: CPT

## 2024-10-16 RX ADMIN — GUAIFENESIN 1200 MG: 600 TABLET, EXTENDED RELEASE ORAL at 08:18

## 2024-10-16 RX ADMIN — AZITHROMYCIN 500 MG: 250 TABLET, FILM COATED ORAL at 08:17

## 2024-10-16 RX ADMIN — BUDESONIDE 1 MG: 1 INHALANT ORAL at 19:12

## 2024-10-16 RX ADMIN — IPRATROPIUM BROMIDE AND ALBUTEROL SULFATE 3 ML: 2.5; .5 SOLUTION RESPIRATORY (INHALATION) at 11:12

## 2024-10-16 RX ADMIN — Medication 10 ML: at 08:18

## 2024-10-16 RX ADMIN — IPRATROPIUM BROMIDE AND ALBUTEROL SULFATE 3 ML: 2.5; .5 SOLUTION RESPIRATORY (INHALATION) at 15:59

## 2024-10-16 RX ADMIN — PANTOPRAZOLE SODIUM 40 MG: 40 TABLET, DELAYED RELEASE ORAL at 05:23

## 2024-10-16 RX ADMIN — PREDNISONE 40 MG: 20 TABLET ORAL at 08:17

## 2024-10-16 RX ADMIN — CEFTRIAXONE SODIUM 1000 MG: 1 INJECTION, POWDER, FOR SOLUTION INTRAMUSCULAR; INTRAVENOUS at 20:22

## 2024-10-16 RX ADMIN — IPRATROPIUM BROMIDE AND ALBUTEROL SULFATE 3 ML: 2.5; .5 SOLUTION RESPIRATORY (INHALATION) at 07:59

## 2024-10-16 RX ADMIN — ENOXAPARIN SODIUM 40 MG: 100 INJECTION SUBCUTANEOUS at 20:21

## 2024-10-16 RX ADMIN — BUDESONIDE 1 MG: 1 INHALANT ORAL at 08:02

## 2024-10-16 RX ADMIN — SENNOSIDES AND DOCUSATE SODIUM 2 TABLET: 50; 8.6 TABLET ORAL at 20:22

## 2024-10-16 RX ADMIN — IPRATROPIUM BROMIDE AND ALBUTEROL SULFATE 3 ML: 2.5; .5 SOLUTION RESPIRATORY (INHALATION) at 19:11

## 2024-10-16 RX ADMIN — GUAIFENESIN 1200 MG: 600 TABLET, EXTENDED RELEASE ORAL at 20:21

## 2024-10-16 NOTE — PROGRESS NOTES
Name: Sisi Francisco ADMIT: 10/15/2024   : 1940  PCP: Provider, No Known    MRN: [id number] LOS: 0 days   AGE/SEX: 84 y.o. female  ROOM: [facility]     Subjective   Subjective   Patient seen this morning.  Reports she is feeling better, continues to have productive cough, improving.  Denies chest pain or palpitations, fevers, chills.  On 1 L nasal cannula.    Review of Systems   As above  Objective   Objective   Vital Signs  Temp:  [97.5 °F (36.4 °C)-98.4 °F (36.9 °C)] 98.1 °F (36.7 °C)  Heart Rate:  [65-90] 90  Resp:  [14-22] 14  BP: (121-150)/(65-84) 123/65  SpO2:  [92 %-99 %] 96 %  on  Flow (L/min) (Oxygen Therapy):  [1-2] 1;   Device (Oxygen Therapy): room air  Body mass index is 28.12 kg/m².  Physical Exam    General: Alert, laying in bed, not in distress, pleasant  HEENT: Normocephalic, atraumatic  CV: Regular rate and rhythm, no murmurs rubs or gallops  Lungs: Clear to auscultation bilaterally, no crackles or wheezes  Abdomen: Soft, nontender, nondistended  Extremities: No significant peripheral edema , no cyanosis     Results Review     I reviewed the patient's new clinical results.  Results from last 7 days   Lab Units 10/16/24  0531 10/15/24  1021 10/13/24  0534 10/12/24  1737   WBC 10*3/mm3 9.36 14.69* 13.36* 18.53*   HEMOGLOBIN g/dL 13.3 14.2 12.6 14.2   PLATELETS 10*3/mm3 314 360 262 279     Results from last 7 days   Lab Units 10/16/24  0531 10/15/24  1021 10/13/24  0534 10/12/24  1737   SODIUM mmol/L 137 137 135* 134*   POTASSIUM mmol/L 4.9 4.2 3.8 4.1   CHLORIDE mmol/L 105 104 102 98   CO2 mmol/L 23.9 24.2 23.0 24.0   BUN mg/dL 13 21 14 11   CREATININE mg/dL 0.89 1.02* 0.74 0.84   GLUCOSE mg/dL 153* 90 225* 136*   Estimated Creatinine Clearance: 49 mL/min (by C-G formula based on SCr of 0.89 mg/dL).  Results from last 7 days   Lab Units 10/15/24  1021 10/12/24  1737   ALBUMIN g/dL 3.4* 3.5   BILIRUBIN mg/dL 0.3 0.7   ALK PHOS U/L 118* 132*   AST (SGOT) U/L 26 32   ALT (SGPT) U/L 22 22  "    Results from last 7 days   Lab Units 10/16/24  0531 10/15/24  1021 10/13/24  0534 10/12/24  1737   CALCIUM mg/dL 9.3 9.5 9.0 9.1   ALBUMIN g/dL  --  3.4*  --  3.5   MAGNESIUM mg/dL 2.3  --   --   --    PHOSPHORUS mg/dL 3.7  --   --   --      Results from last 7 days   Lab Units 10/15/24  1159 10/15/24  1021 10/12/24  1737   PROCALCITONIN ng/mL  --  0.04  --    LACTATE mmol/L 2.0  --  0.9     COVID19   Date Value Ref Range Status   10/12/2024 Not Detected Not Detected - Ref. Range Final     No results found for: \"HGBA1C\", \"POCGLU\"        CT Angiogram Chest  CT ANGIOGRAM OF THE CHEST. MULTIPLE CORONAL, SAGITTAL, AND 3-D  RECONSTRUCTIONS.     HISTORY: 84-year-old female with cough and shortness of breath. Right  mastectomy in the past.     TECHNIQUE: Radiation dose reduction techniques were utilized, including  automated exposure control and exposure modulation based on body size.   CT angiogram of the chest was performed with 2mm images following the  administration of IV contrast. Multiple coronal, sagittal, and 3-D  reconstruction images were obtained. No previous CT for comparison.     FINDINGS:  1. There is no convincing evidence for pulmonary thromboemboli.     2. There is near complete opacification of the bilateral lower lobe  bronchi, more prominent on the right and there is also significant  bronchial thickening and partial opacification at the right middle lobe  and lingula, consistent with bronchitis. The right interlobar bronchus  is nearly completely opacified as well.     There are extensive peribronchial reticular nodular opacities and very  small ill-defined airspace opacities of both lower lobes consistent with  bronchiolitis and developing pneumonia. There are no pleural or  pericardial effusions, but there is pulmonary vascular prominence  diffusely.     There is no lymphadenopathy within the chest and there is no axillary  lymphadenopathy.     3. No acute abnormality is seen at the visualized " upper abdomen. An  approximately 8.5 cm cyst is noted at the upper pole of the left kidney.     This report was finalized on 10/16/2024 11:51 AM by Dr. Abril Graham M.D  on Workstation: [id number]       Scheduled Medications  azithromycin, 500 mg, Oral, Q24H  budesonide, 1 mg, Nebulization, BID - RT  cefTRIAXone, 1,000 mg, Intravenous, Q24H  enoxaparin, 40 mg, Subcutaneous, Nightly  guaiFENesin, 1,200 mg, Oral, Q12H  ipratropium-albuterol, 3 mL, Nebulization, 4x Daily - RT  pantoprazole, 40 mg, Oral, Q AM  predniSONE, 40 mg, Oral, Daily With Breakfast  senna-docusate sodium, 2 tablet, Oral, BID  sodium chloride, 10 mL, Intravenous, Q12H    Infusions   Diet  Diet: Regular/House; Fluid Consistency: Thin (IDDSI 0)    I have personally reviewed     []  Laboratory   []  Microbiology   []  Radiology   []  EKG/Telemetry  []  Cardiology/Vascular   []  Pathology    []  Records       Assessment/Plan     Active Hospital Problems    Diagnosis  POA    **Hypoxia [R09.02]  Yes    Asthma [J45.909]  Unknown    CAP (community acquired pneumonia) [J18.9]  Unknown    Elevated serum creatinine [R79.89]  Unknown      Resolved Hospital Problems   No resolved problems to display.       Patient is a 84-year-old female with a history of asthma, on as needed albuterol only and not  on scheduled inhalers recent admission to the hospital with pneumonia and asthma exacerbation room 10/12/24 through 10/14/2024 presented to the ED with acute worsening shortness of breath        Community-acquired pneumonia  Acute Hypoxic Respiratory failure   Asthma  -Oxygen dropped to 85% on room air per report  -CTA with no PE, but did show bilateral pneumonia  -WBC was 14.69 on admission, up from 15.36, however patient did receive dose during recent hospitalization  -Continue scheduled DuoNeb, as needed albuterol  -Continue IV ceftriaxone, azithromycin  -MRSA nares ordered/strep/Legionella, negative.   -WBC normalized  -Wean off O2 as able, O2 saturation goal  90% and above.  On 1 L nasal cannula  -Pulmonology following     Elevated creatinine  -Last 2 creatinines on 10/12 and 12/15 was 0.84 and 0.74 respectively  -Creatinine admission was 1.02  -Improved, stable, monitor     Hyperglycemia  -Check hemoglobin A1c    SCDs for DVT prophylaxis.  Full code.  Discussed with patient.  Expected discharge date/ time has not been documented.       Copied text in this note has been reviewed and is accurate as of 10/16/24.         Dictated utilizing Dragon dictation        Jean Carlos Ruiz MD  [facility]  10/16/24  13:17 EDT
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
## 2024-10-16 NOTE — PLAN OF CARE
Goal Outcome Evaluation:    Patient alert ad oriented, VSS, on room aor. Denies pain. Educated on incentive spirometer and flutter valve. Receiving antibiotics. Daughter at bedside. Respiratory continuing nebulizer's.

## 2024-10-16 NOTE — PLAN OF CARE
Problem: Adult Inpatient Plan of Care  Goal: Absence of Hospital-Acquired Illness or Injury  Intervention: Identify and Manage Fall Risk  Flowsheets  Taken 10/16/2024 0548  Safety Promotion/Fall Prevention:   safety round/check completed   fall prevention program maintained   nonskid shoes/slippers when out of bed  Taken 10/16/2024 0349  Safety Promotion/Fall Prevention: safety round/check completed  Taken 10/16/2024 0205  Safety Promotion/Fall Prevention: safety round/check completed  Taken 10/16/2024 0022  Safety Promotion/Fall Prevention: safety round/check completed  Taken 10/15/2024 2212  Safety Promotion/Fall Prevention: safety round/check completed  Taken 10/15/2024 2000  Safety Promotion/Fall Prevention: safety round/check completed  Intervention: Prevent Skin Injury  Recent Flowsheet Documentation  Taken 10/16/2024 0548 by Chelsy Keith RN  Body Position: position changed independently  Taken 10/16/2024 0349 by Chelsy Keith RN  Body Position: position changed independently  Taken 10/16/2024 0205 by Chelsy Keith RN  Body Position: position changed independently  Taken 10/15/2024 2000 by Chelsy Keith RN  Body Position: position changed independently  Intervention: Prevent and Manage VTE (Venous Thromboembolism) Risk  Recent Flowsheet Documentation  Taken 10/15/2024 2000 by Chelsy Keith RN  VTE Prevention/Management: patient refused intervention  Intervention: Prevent Infection  Recent Flowsheet Documentation  Taken 10/16/2024 0548 by Chelsy Keith RN  Infection Prevention:   rest/sleep promoted   single patient room provided  Taken 10/16/2024 0349 by Chelsy Keith RN  Infection Prevention:   single patient room provided   rest/sleep promoted  Taken 10/16/2024 0205 by Chelsy Keith RN  Infection Prevention:   rest/sleep promoted   single patient room provided  Taken 10/16/2024 0022 by Chelsy Keith RN  Infection Prevention:   single patient room provided   rest/sleep promoted  Taken  10/15/2024 2212 by Chelsy Keith RN  Infection Prevention:   single patient room provided   rest/sleep promoted  Taken 10/15/2024 2000 by Chelsy Keith RN  Infection Prevention:   rest/sleep promoted   single patient room provided  Goal: Optimal Comfort and Wellbeing  Intervention: Monitor Pain and Promote Comfort  Flowsheets (Taken 10/16/2024 0548)  Pain Management Interventions:   care clustered   quiet environment facilitated  Intervention: Provide Person-Centered Care  Recent Flowsheet Documentation  Taken 10/15/2024 2000 by Chelsy Keith RN  Trust Relationship/Rapport:   care explained   reassurance provided   questions answered     Problem: Fall Injury Risk  Goal: Absence of Fall and Fall-Related Injury  Intervention: Identify and Manage Contributors  Recent Flowsheet Documentation  Taken 10/16/2024 0548 by Chelsy Keith RN  Medication Review/Management: medications reviewed  Taken 10/16/2024 0349 by Chelsy Keith RN  Medication Review/Management: medications reviewed  Taken 10/16/2024 0205 by Chelsy Keith RN  Medication Review/Management: medications reviewed  Taken 10/16/2024 0022 by Chelsy Keith RN  Medication Review/Management: medications reviewed  Taken 10/15/2024 2212 by Chelsy Keith RN  Medication Review/Management: medications reviewed  Taken 10/15/2024 2000 by Chelsy Keith RN  Medication Review/Management: medications reviewed  Intervention: Promote Injury-Free Environment  Recent Flowsheet Documentation  Taken 10/16/2024 0548 by Chelsy Keith RN  Safety Promotion/Fall Prevention:   safety round/check completed   fall prevention program maintained   nonskid shoes/slippers when out of bed  Taken 10/16/2024 0349 by Chelsy Keith RN  Safety Promotion/Fall Prevention: safety round/check completed  Taken 10/16/2024 0205 by Chelsy Keith RN  Safety Promotion/Fall Prevention: safety round/check completed  Taken 10/16/2024 0022 by Chelsy Keith RN  Safety Promotion/Fall  Prevention: safety round/check completed  Taken 10/15/2024 2212 by Chelsy Keith RN  Safety Promotion/Fall Prevention: safety round/check completed  Taken 10/15/2024 2000 by Chelsy Keith RN  Safety Promotion/Fall Prevention: safety round/check completed     Problem: Comorbidity Management  Goal: Maintenance of Asthma Control  Intervention: Maintain Asthma Symptom Control  Recent Flowsheet Documentation  Taken 10/16/2024 0548 by Chelsy Keith RN  Medication Review/Management: medications reviewed  Taken 10/16/2024 0349 by Chelsy Keith RN  Medication Review/Management: medications reviewed  Taken 10/16/2024 0205 by Chelsy Keith RN  Medication Review/Management: medications reviewed  Taken 10/16/2024 0022 by Chelsy Keith RN  Medication Review/Management: medications reviewed  Taken 10/15/2024 2212 by Chelsy Keith RN  Medication Review/Management: medications reviewed  Taken 10/15/2024 2000 by Chelsy Keith RN  Medication Review/Management: medications reviewed  Goal: Maintenance of Behavioral Health Symptom Control  Intervention: Maintain Behavioral Health Symptom Control  Recent Flowsheet Documentation  Taken 10/16/2024 0548 by Chelsy Keith RN  Medication Review/Management: medications reviewed  Taken 10/16/2024 0349 by Chelsy Keith RN  Medication Review/Management: medications reviewed  Taken 10/16/2024 0205 by Chelsy Keith RN  Medication Review/Management: medications reviewed  Taken 10/16/2024 0022 by Chelsy Keith RN  Medication Review/Management: medications reviewed  Taken 10/15/2024 2212 by Chelsy Keith RN  Medication Review/Management: medications reviewed  Taken 10/15/2024 2000 by Chelsy Keith RN  Medication Review/Management: medications reviewed  Goal: Maintenance of COPD Symptom Control  Intervention: Maintain COPD (Chronic Obstructive Pulmonary Disease) Symptom Control  Recent Flowsheet Documentation  Taken 10/16/2024 0548 by Chelsy Keith RN  Medication  Review/Management: medications reviewed  Taken 10/16/2024 0349 by Chelsy Keith RN  Medication Review/Management: medications reviewed  Taken 10/16/2024 0205 by Chelsy Keith RN  Medication Review/Management: medications reviewed  Taken 10/16/2024 0022 by Chelsy Keith RN  Medication Review/Management: medications reviewed  Taken 10/15/2024 2212 by Chelsy Keith RN  Medication Review/Management: medications reviewed  Taken 10/15/2024 2000 by Chelsy Keith RN  Medication Review/Management: medications reviewed  Goal: Blood Glucose Level Within Target Range  Intervention: Monitor and Manage Glycemia  Recent Flowsheet Documentation  Taken 10/16/2024 0548 by Chelsy Keith RN  Medication Review/Management: medications reviewed  Taken 10/16/2024 0349 by Chelsy Keith RN  Medication Review/Management: medications reviewed  Taken 10/16/2024 0205 by Chelsy Keith RN  Medication Review/Management: medications reviewed  Taken 10/16/2024 0022 by Chelsy Keiht RN  Medication Review/Management: medications reviewed  Taken 10/15/2024 2212 by Chelsy Keith RN  Medication Review/Management: medications reviewed  Taken 10/15/2024 2000 by Chelsy Keith RN  Medication Review/Management: medications reviewed  Goal: Maintenance of Heart Failure Symptom Control  Intervention: Maintain Heart Failure Management  Recent Flowsheet Documentation  Taken 10/16/2024 0548 by Chelsy Keith RN  Medication Review/Management: medications reviewed  Taken 10/16/2024 0349 by Chelsy Keith RN  Medication Review/Management: medications reviewed  Taken 10/16/2024 0205 by Chelsy Keith RN  Medication Review/Management: medications reviewed  Taken 10/16/2024 0022 by Chelsy Keith RN  Medication Review/Management: medications reviewed  Taken 10/15/2024 2212 by Chelsy Keith RN  Medication Review/Management: medications reviewed  Taken 10/15/2024 2000 by Chelsy Keith RN  Medication Review/Management: medications  reviewed  Goal: Blood Pressure in Desired Range  Intervention: Maintain Blood Pressure Management  Recent Flowsheet Documentation  Taken 10/16/2024 0548 by Chelsy Keith RN  Medication Review/Management: medications reviewed  Taken 10/16/2024 0349 by Chelsy Keith RN  Medication Review/Management: medications reviewed  Taken 10/16/2024 0205 by Chelsy Keith RN  Medication Review/Management: medications reviewed  Taken 10/16/2024 0022 by Chelsy Keith RN  Medication Review/Management: medications reviewed  Taken 10/15/2024 2212 by Chelsy Keith RN  Medication Review/Management: medications reviewed  Taken 10/15/2024 2000 by Chelsy Keith RN  Medication Review/Management: medications reviewed  Goal: Maintenance of Osteoarthritis Symptom Control  Intervention: Maintain Osteoarthritis Symptom Control  Recent Flowsheet Documentation  Taken 10/16/2024 0548 by Chelsy Keith RN  Medication Review/Management: medications reviewed  Taken 10/16/2024 0349 by Chelsy Keith RN  Medication Review/Management: medications reviewed  Taken 10/16/2024 0205 by Chelsy Keith RN  Medication Review/Management: medications reviewed  Taken 10/16/2024 0022 by Chelsy Keith RN  Medication Review/Management: medications reviewed  Taken 10/15/2024 2212 by Chelsy Keith RN  Medication Review/Management: medications reviewed  Taken 10/15/2024 2000 by Chelsy Keith RN  Activity Management: up ad zonia  Medication Review/Management: medications reviewed  Goal: Bariatric Home Regimen Maintained  Intervention: Maintain and Manage Postbariatric Surgery Care  Recent Flowsheet Documentation  Taken 10/16/2024 0548 by Chelsy Keith RN  Medication Review/Management: medications reviewed  Taken 10/16/2024 0349 by Chelsy Keith RN  Medication Review/Management: medications reviewed  Taken 10/16/2024 0205 by Chelsy Keith RN  Medication Review/Management: medications reviewed  Taken 10/16/2024 0022 by Chelsy Keith RN  Medication  Review/Management: medications reviewed  Taken 10/15/2024 2212 by Chelsy Keith RN  Medication Review/Management: medications reviewed  Taken 10/15/2024 2000 by Chelsy Keith RN  Medication Review/Management: medications reviewed  Goal: Maintenance of Seizure Control  Intervention: Maintain Seizure Symptom Control  Recent Flowsheet Documentation  Taken 10/16/2024 0548 by Chelsy Keith RN  Medication Review/Management: medications reviewed  Taken 10/16/2024 0349 by Chelsy Keith RN  Medication Review/Management: medications reviewed  Taken 10/16/2024 0205 by Chelsy Keith RN  Medication Review/Management: medications reviewed  Taken 10/16/2024 0022 by Chelsy Keith RN  Medication Review/Management: medications reviewed  Taken 10/15/2024 2212 by Chelsy Keith RN  Medication Review/Management: medications reviewed  Taken 10/15/2024 2000 by Chelsy Keith RN  Medication Review/Management: medications reviewed   Goal Outcome Evaluation:

## 2024-10-16 NOTE — OUTREACH NOTE
COPD/PN Week 1 Survey      Flowsheet Row Responses   [facility] facility patient discharged from? [facility]   Does the patient have one of the following disease processes/diagnoses(primary or secondary)? Pneumonia   Week 1 attempt successful? No   Unsuccessful attempts Attempt 1   Revoke Readmitted            BALDO VILLEGAS - Registered Nurse
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
## 2024-10-16 NOTE — PLAN OF CARE
Goal Outcome Evaluation:  Plan of Care Reviewed With: patient, child           Outcome Evaluation: Pt is a 85 y/o F admitted to [facility] with c/o SOA with work-up revealing PNA. Pt lives in [place] but currently residing in a hotel with daughter assist. Pt reports she is typically (I) with mobility at baseline. Pt sitting [facility] upon arrival - pt stood and ambulated 300'c RW independently/SPV. Pt appears at her functional baseline and does not require acute PT at this time. PT encouraged/discussed HEP and ambulation throughout the day. Pt plans home with daugher assist until she can return to [place].    Anticipated Discharge Disposition (PT): home with assist
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
## 2024-10-16 NOTE — DISCHARGE PLACEMENT REQUEST
"Sisi Francisco (84 y.o. Female)       Date of Birth   1940    Social Security Number       Address   [address]   [place] [address] [place] [address]    Home Phone   [phone]    MRN   [id number]       Mandaeism   Latter-day    Marital Status                               Admission Date   10/15/24    Admission Type   Emergency    Admitting Provider   Jean Carlos Ruiz MD    Attending Provider   Jean Carlos Ruiz MD    Department, Room/Bed   [address], S509/1       Discharge Date       Discharge Disposition       Discharge Destination                                 Attending Provider: Jean Carlos Ruiz MD    Allergies: Amlodipine Besylate, Lisinopril, Losartan, Simvastatin    Isolation: None   Infection: None   Code Status: CPR    Ht: 167.6 cm (66\")   Wt: 79 kg (174 lb 3.2 oz)    Admission Cmt: None   Principal Problem: Hypoxia [R09.02]                   Active Insurance as of 10/15/2024       Primary Coverage       Payor Plan Insurance Group Employer/Plan Group    [facility] MEDICARE REPLACEMENT [facility] MED ADV GROUP [id number]       Payor Plan Address Payor Plan Phone Number Payor Plan Fax Number Effective Dates    [address] [address]   4/1/2024 - None Entered    [facility] [id number]         Subscriber Name Subscriber Birth Date Member ID       SISI FRANCISCO 1940 [id number]                     Emergency Contacts        (Rel.) Home Phone Work Phone Mobile Phone    VAUGHN DAVILA (Daughter) -- -- [phone]    SUNNY REDD (Daughter) [phone] -- [phone]                "

## 2024-10-16 NOTE — PROGRESS NOTES
[facility]  [phone]  Dr. Russ Lester    Subjective:  LOS: 0        Objective:  Vital Signs past 24hrs    Temp range: Temp (24hrs), Av °F (36.7 °C), Min:97.7 °F (36.5 °C), Max:98.4 °F (36.9 °C)    BP range: BP: (121-144)/(65-84) 131/66  Pulse range: Heart Rate:  [65-90] 76  Resp rate range: Resp:  [14-22] 16  79 kg (174 lb 3.2 oz); Body mass index is 28.12 kg/m².    Device (Oxygen Therapy): room airFlow (L/min) (Oxygen Therapy):  [1-2] 1    Oxygen range:SpO2:  [92 %-99 %] 93 %            Intake/Output Summary (Last 24 hours) at 10/16/2024 1647  Last data filed at 10/16/2024 0817  Gross per 24 hour   Intake 800 ml   Output 400 ml   Net 400 ml       Physical Exam       Result Review:  I have reviewed the results from last note by LPC physician and agree with these findings:  []  Laboratory accordion  []  Microbiology  []  Radiology  []  EKG/Telemetry   []  Cardiology/Vascular   []  Pathology  []  Old records  []  Other:    Medication Review:  I have reviewed the current MAR.  Antibiotics  azithromycin - 250 MG  cefdinir - 300 MG  cefTRIAXone (ROCEPHIN) 1000 mg IVPB in 100 mL NS (MBP)     Scheduled Medications  azithromycin, 500 mg, Oral, Q24H  budesonide, 1 mg, Nebulization, BID - RT  cefTRIAXone, 1,000 mg, Intravenous, Q24H  enoxaparin, 40 mg, Subcutaneous, Nightly  guaiFENesin, 1,200 mg, Oral, Q12H  ipratropium-albuterol, 3 mL, Nebulization, 4x Daily - RT  pantoprazole, 40 mg, Oral, Q AM  predniSONE, 40 mg, Oral, Daily With Breakfast  senna-docusate sodium, 2 tablet, Oral, BID  sodium chloride, 10 mL, Intravenous, Q12H      ICU Drips     PRN Medications    albuterol    senna-docusate sodium **AND** polyethylene glycol **AND** bisacodyl **AND** bisacodyl    nitroglycerin    sodium chloride    sodium chloride    Lines, Drains & Airways       Active LDAs       Name Placement date Placement time Site Days    Peripheral IV 10/15/24 1248 Left Antecubital 10/15/24  1248  Antecubital  1                     Diet Orders (active) (From admission, onward)       Start     Ordered    10/15/24 1448  Diet: Regular/House; Fluid Consistency: Thin (IDDSI 0)  Diet Effective Now         10/15/24 1447                      Assessment:  Acute hypoxic respiratory failure secondary to CAP   Asthma exacerbation  Leukocytosis      Plan of Treatment  - Continue IV ceftriaxone and PO azithromycin for 5 day to cover for CAP  - Use PO prednisone 40 mg daily for 5 days (patient wants to avoid IV steroids)   - Patient does better with nebulized medications, continue use of duonebs and add nebulized pulmicort.  - Check strep/legionella antigens  - f/u blood cultures  - Incentive spirometer and flutter valve therapy  - f/u respiratory culture    Overall, patient continues to improve.  She reports feeling better today than she did yesterday.  She feels still weak.  She is not in a hurry to discharge from the hospital too soon, because she wants to make sure that she is strong enough and want bounce back and be readmitted.  I told her that I think it is reasonable that she read received several more days of IV antibiotics and continue bronchodilator therapy.          Russ Lester MD  [facility], [facility]  Pulmonary and Critical Care Medicine    Electronically signed by Russ Lester MD, 10/16/24, 4:47 PM EDT.  Part of this note may be an electronic transcription/translation of spoken language to printed text using the Dragon Dictation System.
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
## 2024-10-17 PROBLEM — I10 ESSENTIAL HYPERTENSION: Status: ACTIVE | Noted: 2024-10-17

## 2024-10-17 PROBLEM — R73.9 HYPERGLYCEMIA: Status: ACTIVE | Noted: 2024-10-17

## 2024-10-17 LAB
ANION GAP SERPL CALCULATED.3IONS-SCNC: 5 MMOL/L (ref 5–15)
BACTERIA SPEC AEROBE CULT: NORMAL
BACTERIA SPEC AEROBE CULT: NORMAL
BUN SERPL-MCNC: 14 MG/DL (ref 8–23)
BUN/CREAT SERPL: 15.7 (ref 7–25)
CALCIUM SPEC-SCNC: 9.3 MG/DL (ref 8.6–10.5)
CHLORIDE SERPL-SCNC: 104 MMOL/L (ref 98–107)
CO2 SERPL-SCNC: 27 MMOL/L (ref 22–29)
CREAT SERPL-MCNC: 0.89 MG/DL (ref 0.57–1)
DEPRECATED RDW RBC AUTO: 42.7 FL (ref 37–54)
EGFRCR SERPLBLD CKD-EPI 2021: 63.6 ML/MIN/1.73
ERYTHROCYTE [DISTWIDTH] IN BLOOD BY AUTOMATED COUNT: 13.3 % (ref 12.3–15.4)
GLUCOSE SERPL-MCNC: 76 MG/DL (ref 65–99)
HBA1C MFR BLD: 5.6 % (ref 4.8–5.6)
HCT VFR BLD AUTO: 38.6 % (ref 34–46.6)
HGB BLD-MCNC: 12.8 G/DL (ref 12–15.9)
MAGNESIUM SERPL-MCNC: 2.3 MG/DL (ref 1.6–2.4)
MCH RBC QN AUTO: 29.1 PG (ref 26.6–33)
MCHC RBC AUTO-ENTMCNC: 33.2 G/DL (ref 31.5–35.7)
MCV RBC AUTO: 87.7 FL (ref 79–97)
PHOSPHATE SERPL-MCNC: 2.4 MG/DL (ref 2.5–4.5)
PLATELET # BLD AUTO: 308 10*3/MM3 (ref 140–450)
PMV BLD AUTO: 9.6 FL (ref 6–12)
POTASSIUM SERPL-SCNC: 4.4 MMOL/L (ref 3.5–5.2)
RBC # BLD AUTO: 4.4 10*6/MM3 (ref 3.77–5.28)
SODIUM SERPL-SCNC: 136 MMOL/L (ref 136–145)
WBC NRBC COR # BLD AUTO: 13.18 10*3/MM3 (ref 3.4–10.8)

## 2024-10-17 PROCEDURE — 96366 THER/PROPH/DIAG IV INF ADDON: CPT

## 2024-10-17 PROCEDURE — 85027 COMPLETE CBC AUTOMATED: CPT | Performed by: STUDENT IN AN ORGANIZED HEALTH CARE EDUCATION/TRAINING PROGRAM

## 2024-10-17 PROCEDURE — 96372 THER/PROPH/DIAG INJ SC/IM: CPT

## 2024-10-17 PROCEDURE — 63710000001 PREDNISONE PER 1 MG: Performed by: STUDENT IN AN ORGANIZED HEALTH CARE EDUCATION/TRAINING PROGRAM

## 2024-10-17 PROCEDURE — 83735 ASSAY OF MAGNESIUM: CPT | Performed by: STUDENT IN AN ORGANIZED HEALTH CARE EDUCATION/TRAINING PROGRAM

## 2024-10-17 PROCEDURE — 25010000002 ENOXAPARIN PER 10 MG: Performed by: STUDENT IN AN ORGANIZED HEALTH CARE EDUCATION/TRAINING PROGRAM

## 2024-10-17 PROCEDURE — G0378 HOSPITAL OBSERVATION PER HR: HCPCS

## 2024-10-17 PROCEDURE — 83036 HEMOGLOBIN GLYCOSYLATED A1C: CPT | Performed by: STUDENT IN AN ORGANIZED HEALTH CARE EDUCATION/TRAINING PROGRAM

## 2024-10-17 PROCEDURE — 94799 UNLISTED PULMONARY SVC/PX: CPT

## 2024-10-17 PROCEDURE — 84100 ASSAY OF PHOSPHORUS: CPT | Performed by: STUDENT IN AN ORGANIZED HEALTH CARE EDUCATION/TRAINING PROGRAM

## 2024-10-17 PROCEDURE — 94761 N-INVAS EAR/PLS OXIMETRY MLT: CPT

## 2024-10-17 PROCEDURE — 94760 N-INVAS EAR/PLS OXIMETRY 1: CPT

## 2024-10-17 PROCEDURE — 94664 DEMO&/EVAL PT USE INHALER: CPT

## 2024-10-17 PROCEDURE — 80048 BASIC METABOLIC PNL TOTAL CA: CPT | Performed by: STUDENT IN AN ORGANIZED HEALTH CARE EDUCATION/TRAINING PROGRAM

## 2024-10-17 PROCEDURE — 25010000002 CEFTRIAXONE PER 250 MG: Performed by: STUDENT IN AN ORGANIZED HEALTH CARE EDUCATION/TRAINING PROGRAM

## 2024-10-17 RX ORDER — TERAZOSIN 1 MG/1
1 CAPSULE ORAL NIGHTLY
Status: DISCONTINUED | OUTPATIENT
Start: 2024-10-17 | End: 2024-10-18 | Stop reason: HOSPADM

## 2024-10-17 RX ADMIN — PANTOPRAZOLE SODIUM 40 MG: 40 TABLET, DELAYED RELEASE ORAL at 06:18

## 2024-10-17 RX ADMIN — BUDESONIDE 1 MG: 1 INHALANT ORAL at 19:55

## 2024-10-17 RX ADMIN — SENNOSIDES AND DOCUSATE SODIUM 2 TABLET: 50; 8.6 TABLET ORAL at 21:16

## 2024-10-17 RX ADMIN — BUDESONIDE 1 MG: 1 INHALANT ORAL at 08:09

## 2024-10-17 RX ADMIN — SENNOSIDES AND DOCUSATE SODIUM 2 TABLET: 50; 8.6 TABLET ORAL at 08:15

## 2024-10-17 RX ADMIN — IPRATROPIUM BROMIDE AND ALBUTEROL SULFATE 3 ML: 2.5; .5 SOLUTION RESPIRATORY (INHALATION) at 11:01

## 2024-10-17 RX ADMIN — PREDNISONE 40 MG: 20 TABLET ORAL at 08:15

## 2024-10-17 RX ADMIN — IPRATROPIUM BROMIDE AND ALBUTEROL SULFATE 3 ML: 2.5; .5 SOLUTION RESPIRATORY (INHALATION) at 15:37

## 2024-10-17 RX ADMIN — Medication 10 ML: at 08:15

## 2024-10-17 RX ADMIN — IPRATROPIUM BROMIDE AND ALBUTEROL SULFATE 3 ML: 2.5; .5 SOLUTION RESPIRATORY (INHALATION) at 19:55

## 2024-10-17 RX ADMIN — CEFTRIAXONE SODIUM 1000 MG: 1 INJECTION, POWDER, FOR SOLUTION INTRAMUSCULAR; INTRAVENOUS at 18:20

## 2024-10-17 RX ADMIN — AZITHROMYCIN 500 MG: 250 TABLET, FILM COATED ORAL at 08:15

## 2024-10-17 RX ADMIN — ENOXAPARIN SODIUM 40 MG: 100 INJECTION SUBCUTANEOUS at 21:16

## 2024-10-17 RX ADMIN — TERAZOSIN 1 MG: 1 CAPSULE ORAL at 21:16

## 2024-10-17 RX ADMIN — Medication 10 ML: at 21:20

## 2024-10-17 RX ADMIN — GUAIFENESIN 1200 MG: 600 TABLET, EXTENDED RELEASE ORAL at 21:15

## 2024-10-17 RX ADMIN — IPRATROPIUM BROMIDE AND ALBUTEROL SULFATE 3 ML: 2.5; .5 SOLUTION RESPIRATORY (INHALATION) at 08:05

## 2024-10-17 RX ADMIN — GUAIFENESIN 1200 MG: 600 TABLET, EXTENDED RELEASE ORAL at 08:15

## 2024-10-17 NOTE — PLAN OF CARE
Problem: Adult Inpatient Plan of Care  Goal: Plan of Care Review  Outcome: Progressing  Flowsheets (Taken 10/17/2024 1748)  Progress: improving  Plan of Care Reviewed With: patient  Goal: Patient-Specific Goal (Individualized)  Outcome: Progressing  Goal: Absence of Hospital-Acquired Illness or Injury  Outcome: Progressing  Intervention: Identify and Manage Fall Risk  Recent Flowsheet Documentation  Taken 10/17/2024 1600 by Marlen Brambila RN  Safety Promotion/Fall Prevention:   activity supervised   nonskid shoes/slippers when out of bed  Taken 10/17/2024 1400 by Marlen Brambila RN  Safety Promotion/Fall Prevention: activity supervised  Taken 10/17/2024 1300 by Marlen Brambila RN  Safety Promotion/Fall Prevention: activity supervised  Taken 10/17/2024 1000 by Marlen Brambila RN  Safety Promotion/Fall Prevention:   activity supervised   safety round/check completed  Taken 10/17/2024 0806 by Marlen Brambila RN  Safety Promotion/Fall Prevention:   activity supervised   safety round/check completed  Intervention: Prevent Skin Injury  Recent Flowsheet Documentation  Taken 10/17/2024 1600 by Marlen Brambila RN  Body Position: position changed independently  Taken 10/17/2024 1400 by Marlen Brambila RN  Body Position: position changed independently  Taken 10/17/2024 1300 by Marlen Brambila RN  Body Position: position changed independently  Taken 10/17/2024 1000 by Marlen Brambila RN  Body Position: position changed independently  Taken 10/17/2024 0806 by Marlen Brambila RN  Body Position: position changed independently  Intervention: Prevent and Manage VTE (Venous Thromboembolism) Risk  Recent Flowsheet Documentation  Taken 10/17/2024 0806 by Marlen Brambila RN  VTE Prevention/Management: patient refused intervention  Intervention: Prevent Infection  Recent Flowsheet Documentation  Taken 10/17/2024 1600 by Marlen Brambila RN  Infection Prevention:   hand hygiene promoted   single patient room provided   environmental surveillance  performed  Taken 10/17/2024 1300 by Marlen Brambila RN  Infection Prevention:   hand hygiene promoted   environmental surveillance performed   single patient room provided  Taken 10/17/2024 1000 by Marlen Brambila RN  Infection Prevention:   hand hygiene promoted   environmental surveillance performed   single patient room provided  Taken 10/17/2024 0806 by Marlen Brambila RN  Infection Prevention:   environmental surveillance performed   hand hygiene promoted   single patient room provided  Goal: Optimal Comfort and Wellbeing  Outcome: Progressing  Intervention: Provide Person-Centered Care  Recent Flowsheet Documentation  Taken 10/17/2024 0806 by Marlen Brambila RN  Trust Relationship/Rapport: care explained  Goal: Readiness for Transition of Care  Outcome: Progressing     Problem: Fall Injury Risk  Goal: Absence of Fall and Fall-Related Injury  Outcome: Progressing  Intervention: Identify and Manage Contributors  Recent Flowsheet Documentation  Taken 10/17/2024 0806 by aMrlen Brambila RN  Medication Review/Management: medications reviewed  Intervention: Promote Injury-Free Environment  Recent Flowsheet Documentation  Taken 10/17/2024 1600 by Marlen Brambila RN  Safety Promotion/Fall Prevention:   activity supervised   nonskid shoes/slippers when out of bed  Taken 10/17/2024 1400 by Marlen Brambila RN  Safety Promotion/Fall Prevention: activity supervised  Taken 10/17/2024 1300 by Marlen Brambila RN  Safety Promotion/Fall Prevention: activity supervised  Taken 10/17/2024 1000 by Marlen Brambila RN  Safety Promotion/Fall Prevention:   activity supervised   safety round/check completed  Taken 10/17/2024 0806 by Marlen Brambila RN  Safety Promotion/Fall Prevention:   activity supervised   safety round/check completed     Problem: Comorbidity Management  Goal: Maintenance of Asthma Control  Outcome: Progressing  Intervention: Maintain Asthma Symptom Control  Recent Flowsheet Documentation  Taken 10/17/2024 0806 by Marlen Brambila  RN  Medication Review/Management: medications reviewed  Goal: Maintenance of Behavioral Health Symptom Control  Outcome: Progressing  Intervention: Maintain Behavioral Health Symptom Control  Recent Flowsheet Documentation  Taken 10/17/2024 0806 by Marlen Brambila RN  Medication Review/Management: medications reviewed  Goal: Maintenance of COPD Symptom Control  Outcome: Progressing  Intervention: Maintain COPD (Chronic Obstructive Pulmonary Disease) Symptom Control  Recent Flowsheet Documentation  Taken 10/17/2024 0806 by Marlen Brambila RN  Breathing Techniques/Airway Clearance: deep/controlled cough encouraged  Medication Review/Management: medications reviewed  Goal: Blood Glucose Level Within Target Range  Outcome: Progressing  Intervention: Monitor and Manage Glycemia  Recent Flowsheet Documentation  Taken 10/17/2024 0806 by Marlen Brambila RN  Medication Review/Management: medications reviewed  Goal: Maintenance of Heart Failure Symptom Control  Outcome: Progressing  Intervention: Maintain Heart Failure Management  Recent Flowsheet Documentation  Taken 10/17/2024 0806 by Marlen Brambila RN  Medication Review/Management: medications reviewed  Goal: Blood Pressure in Desired Range  Outcome: Progressing  Intervention: Maintain Blood Pressure Management  Recent Flowsheet Documentation  Taken 10/17/2024 0806 by Marlen Brambila RN  Medication Review/Management: medications reviewed  Goal: Maintenance of Osteoarthritis Symptom Control  Outcome: Progressing  Intervention: Maintain Osteoarthritis Symptom Control  Recent Flowsheet Documentation  Taken 10/17/2024 1600 by Marlen Brambila RN  Activity Management: up in chair  Taken 10/17/2024 1400 by Marlen Brambila RN  Activity Management: ambulated outside room  Assistive Device Utilized: walker  Taken 10/17/2024 1300 by Marlen Brambila RN  Activity Management: up in chair  Taken 10/17/2024 1000 by Marlen Brambila RN  Activity Management: ambulated outside room  Assistive Device Utilized:  walker  Taken 10/17/2024 0806 by Marlen Brambila RN  Activity Management: up in chair  Assistive Device Utilized: walker  Medication Review/Management: medications reviewed  Goal: Bariatric Home Regimen Maintained  Outcome: Progressing  Intervention: Maintain and Manage Postbariatric Surgery Care  Recent Flowsheet Documentation  Taken 10/17/2024 0806 by Marlen Brambila RN  Medication Review/Management: medications reviewed  Goal: Maintenance of Seizure Control  Outcome: Progressing  Intervention: Maintain Seizure Symptom Control  Recent Flowsheet Documentation  Taken 10/17/2024 0806 by Marlen Brambila RN  Medication Review/Management: medications reviewed     Problem: Pneumonia  Goal: Fluid Balance  Outcome: Progressing  Goal: Absence of Infection Signs and Symptoms  Outcome: Progressing  Goal: Effective Oxygenation and Ventilation  Outcome: Progressing  Intervention: Promote Airway Secretion Clearance  Recent Flowsheet Documentation  Taken 10/17/2024 1600 by Marlen Brambila RN  Activity Management: up in chair  Taken 10/17/2024 1400 by Marlen Brambila RN  Activity Management: ambulated outside room  Taken 10/17/2024 1300 by Marlen Brambila RN  Activity Management: up in chair  Taken 10/17/2024 1000 by Marlen Brambila RN  Activity Management: ambulated outside room  Taken 10/17/2024 0806 by Marlen Brambila RN  Activity Management: up in chair  Breathing Techniques/Airway Clearance: deep/controlled cough encouraged  Cough And Deep Breathing: done independently per patient  Intervention: Optimize Oxygenation and Ventilation  Recent Flowsheet Documentation  Taken 10/17/2024 1600 by Marlen Brambila RN  Head of Bed (HOB) Positioning: HOB elevated  Taken 10/17/2024 1400 by Marlen Brambila RN  Head of Bed (HOB) Positioning: HOB elevated  Taken 10/17/2024 1300 by Marlen Brambila RN  Head of Bed (HOB) Positioning: HOB elevated  Taken 10/17/2024 1000 by Mralen Brambila RN  Head of Bed (HOB) Positioning: HOB elevated  Taken 10/17/2024 0806 by Kosta  Marlen RN  Head of Bed (HOB) Positioning: HOB elevated   Goal Outcome Evaluation:  Plan of Care Reviewed With: patient        Progress: improving

## 2024-10-17 NOTE — CASE MANAGEMENT/SOCIAL WORK
Continued Stay Note  [facility]     Patient Name: Sisi Francisco  MRN: [id number]  Today's Date: 10/17/2024    Admit Date: 10/15/2024    Plan: Return to her hotel with assist of family   Discharge Plan       Row Name 10/17/24 1322       Plan    Plan Return to her hotel with assist of family    Plan Comments Anthony/ Dustin notified of order for nebulizer and she delivered the nebulizer to pt..........Shasha BERRIOS/ YAMINI                   Discharge Codes    No documentation.                 Expected Discharge Date and Time       Expected Discharge Date Expected Discharge Time    Oct 18, 2024               Shasha Corona RN

## 2024-10-17 NOTE — PROGRESS NOTES
[facility]  [phone]  Dr. Russ Lester    Subjective:  LOS: 0    No acute events overnight.  She has remained on room air.  She looks and feels better today.  Her and her family are very concerned about her potential hypoxia from the pneumonia and they would like for her to stay 1 more night.    Objective:  Vital Signs past 24hrs    Temp range: Temp (24hrs), Av.7 °F (36.5 °C), Min:97.3 °F (36.3 °C), Max:98.1 °F (36.7 °C)    BP range: BP: (131-159)/(61-90) 140/68  Pulse range: Heart Rate:  [67-94] 78  Resp rate range: Resp:  [16] 16  79 kg (174 lb 3.2 oz); Body mass index is 28.12 kg/m².    Device (Oxygen Therapy): room air     Oxygen range:SpO2:  [93 %-100 %] 98 %          No intake or output data in the 24 hours ending 10/17/24 6978      Physical Exam  Constitutional:       Appearance: Normal appearance.   HENT:      Head: Normocephalic and atraumatic.      Nose: Nose normal.      Mouth/Throat:      Mouth: Mucous membranes are moist.      Pharynx: Oropharynx is clear.   Eyes:      Extraocular Movements: Extraocular movements intact.      Conjunctiva/sclera: Conjunctivae normal.   Cardiovascular:      Rate and Rhythm: Normal rate and regular rhythm.      Pulses: Normal pulses.      Heart sounds: Normal heart sounds. No murmur heard.  Pulmonary:      Effort: Pulmonary effort is normal. No respiratory distress.      Breath sounds: Normal breath sounds. No stridor. No wheezing or rales.   Abdominal:      General: Abdomen is flat. Bowel sounds are normal.      Palpations: Abdomen is soft.   Skin:     General: Skin is warm and dry.   Neurological:      General: No focal deficit present.      Mental Status: She is alert and oriented to person, place, and time. Mental status is at baseline.   Psychiatric:         Mood and Affect: Mood normal.         Behavior: Behavior normal.            Result Review:  I have reviewed the results from last note by LPC physician and agree with these findings:  [x]   Laboratory accordion  [x]  Microbiology  [x]  Radiology  [x]  EKG/Telemetry   [x]  Cardiology/Vascular   [x]  Pathology  []  Old records  []  Other:    Medication Review:  I have reviewed the current MAR.  Antibiotics  azithromycin - 250 MG  cefdinir - 300 MG  cefTRIAXone (ROCEPHIN) 1000 mg IVPB in 100 mL NS (MBP)     Scheduled Medications  azithromycin, 500 mg, Oral, Q24H  budesonide, 1 mg, Nebulization, BID - RT  cefTRIAXone, 1,000 mg, Intravenous, Q24H  enoxaparin, 40 mg, Subcutaneous, Nightly  guaiFENesin, 1,200 mg, Oral, Q12H  ipratropium-albuterol, 3 mL, Nebulization, 4x Daily - RT  pantoprazole, 40 mg, Oral, Q AM  predniSONE, 40 mg, Oral, Daily With Breakfast  senna-docusate sodium, 2 tablet, Oral, BID  sodium chloride, 10 mL, Intravenous, Q12H  terazosin, 1 mg, Oral, Nightly      ICU Drips     PRN Medications    albuterol    senna-docusate sodium **AND** polyethylene glycol **AND** bisacodyl **AND** bisacodyl    nitroglycerin    sodium chloride    sodium chloride    Lines, Drains & Airways       Active LDAs       Name Placement date Placement time Site Days    Peripheral IV 10/15/24 1248 Left Antecubital 10/15/24  1248  Antecubital  1                    Diet Orders (active) (From admission, onward)       Start     Ordered    10/15/24 1448  Diet: Regular/House; Fluid Consistency: Thin (IDDSI 0)  Diet Effective Now         10/15/24 1447                      Assessment:  Acute hypoxic respiratory failure secondary to CAP   Asthma exacerbation  Leukocytosis      Plan of Treatment  - Continue IV ceftriaxone and PO azithromycin for 5 day to cover for CAP  - Use PO prednisone 40 mg daily for 5 days (patient wants to avoid IV steroids)   - Patient does better with nebulized medications, continue use of duonebs and add nebulized pulmicort.  - Infectious workup so far negative  - Incentive spirometer and flutter valve therapy    Overall, patient continues to improve.  She should be able to discharge tomorrow.  Son  and daughter are asking about sending her to rehab, but I explained to them that she is doing well with PT and OT and likely would not qualify for rehab.        Russ Lester MD  [facility], [facility]  Pulmonary and Critical Care Medicine

## 2024-10-17 NOTE — PROGRESS NOTES
Name: Sisi Francisco ADMIT: 10/15/2024   : 1940  PCP: Provider, No Known    MRN: [id number] LOS: 0 days   AGE/SEX: 84 y.o. female  ROOM: [facility]     Subjective   Subjective   She is feeling much better today, but is very anxious about being discharged back to her hotel room, She is now on room air, will monitor over night with her white count increasing this morning,        Objective   Objective   Vital Signs  Temp:  [97.3 °F (36.3 °C)-98.1 °F (36.7 °C)] 98.1 °F (36.7 °C)  Heart Rate:  [67-94] 72  Resp:  [14-16] 16  BP: (123-159)/(61-90) 159/90  SpO2:  [93 %-99 %] 99 %  on   ;   Device (Oxygen Therapy): room air  Body mass index is 28.12 kg/m².  Physical Exam  Vitals and nursing note reviewed.   Constitutional:       General: She is not in acute distress.     Appearance: Normal appearance.   HENT:      Head: Normocephalic and atraumatic.      Mouth/Throat:      Mouth: Mucous membranes are moist.      Pharynx: No posterior oropharyngeal erythema.   Eyes:      General: No scleral icterus.  Neck:      Vascular: No JVD.   Cardiovascular:      Rate and Rhythm: Normal rate and regular rhythm.      Pulses: Normal pulses.      Heart sounds: Normal heart sounds. No murmur heard.  Pulmonary:      Effort: Pulmonary effort is normal. No respiratory distress.      Breath sounds: Normal breath sounds.   Abdominal:      General: Bowel sounds are normal. There is no distension.      Palpations: Abdomen is soft.      Tenderness: There is no abdominal tenderness.   Musculoskeletal:         General: No swelling or tenderness.      Cervical back: Neck supple.   Skin:     General: Skin is warm and dry.      Capillary Refill: Capillary refill takes less than 2 seconds.      Coloration: Skin is not jaundiced.      Findings: No rash.   Neurological:      General: No focal deficit present.      Mental Status: She is alert and oriented to person, place, and time. Mental status is at baseline.   Psychiatric:         Mood and  Affect: Mood normal.         Behavior: Behavior normal.           Results Review     I reviewed the patient's new clinical results.  Results from last 7 days   Lab Units 10/17/24  0720 10/16/24  0531 10/15/24  1021 10/13/24  0534   WBC 10*3/mm3 13.18* 9.36 14.69* 13.36*   HEMOGLOBIN g/dL 12.8 13.3 14.2 12.6   PLATELETS 10*3/mm3 308 314 360 262     Results from last 7 days   Lab Units 10/17/24  0720 10/16/24  0531 10/15/24  1021 10/13/24  0534   SODIUM mmol/L 136 137 137 135*   POTASSIUM mmol/L 4.4 4.9 4.2 3.8   CHLORIDE mmol/L 104 105 104 102   CO2 mmol/L 27.0 23.9 24.2 23.0   BUN mg/dL 14 13 21 14   CREATININE mg/dL 0.89 0.89 1.02* 0.74   GLUCOSE mg/dL 76 153* 90 225*   Estimated Creatinine Clearance: 49 mL/min (by C-G formula based on SCr of 0.89 mg/dL).  Results from last 7 days   Lab Units 10/15/24  1021 10/12/24  1737   ALBUMIN g/dL 3.4* 3.5   BILIRUBIN mg/dL 0.3 0.7   ALK PHOS U/L 118* 132*   AST (SGOT) U/L 26 32   ALT (SGPT) U/L 22 22     Results from last 7 days   Lab Units 10/17/24  0720 10/16/24  0531 10/15/24  1021 10/13/24  0534 10/12/24  1737   CALCIUM mg/dL 9.3 9.3 9.5 9.0 9.1   ALBUMIN g/dL  --   --  3.4*  --  3.5   MAGNESIUM mg/dL 2.3 2.3  --   --   --    PHOSPHORUS mg/dL 2.4* 3.7  --   --   --      Results from last 7 days   Lab Units 10/15/24  1159 10/15/24  1021 10/12/24  1737   PROCALCITONIN ng/mL  --  0.04  --    LACTATE mmol/L 2.0  --  0.9     COVID19   Date Value Ref Range Status   10/12/2024 Not Detected Not Detected - Ref. Range Final     Hemoglobin A1C   Date/Time Value Ref Range Status   10/17/2024 0720 5.60 4.80 - 5.60 % Final           CT Angiogram Chest  CT ANGIOGRAM OF THE CHEST. MULTIPLE CORONAL, SAGITTAL, AND 3-D  RECONSTRUCTIONS.     HISTORY: 84-year-old female with cough and shortness of breath. Right  mastectomy in the past.     TECHNIQUE: Radiation dose reduction techniques were utilized, including  automated exposure control and exposure modulation based on body size.   CT  angiogram of the chest was performed with 2mm images following the  administration of IV contrast. Multiple coronal, sagittal, and 3-D  reconstruction images were obtained. No previous CT for comparison.     FINDINGS:  1. There is no convincing evidence for pulmonary thromboemboli.     2. There is near complete opacification of the bilateral lower lobe  bronchi, more prominent on the right and there is also significant  bronchial thickening and partial opacification at the right middle lobe  and lingula, consistent with bronchitis. The right interlobar bronchus  is nearly completely opacified as well.     There are extensive peribronchial reticular nodular opacities and very  small ill-defined airspace opacities of both lower lobes consistent with  bronchiolitis and developing pneumonia. There are no pleural or  pericardial effusions, but there is pulmonary vascular prominence  diffusely.     There is no lymphadenopathy within the chest and there is no axillary  lymphadenopathy.     3. No acute abnormality is seen at the visualized upper abdomen. An  approximately 8.5 cm cyst is noted at the upper pole of the left kidney.     This report was finalized on 10/16/2024 11:51 AM by Dr. Abril Graham M.D  on Workstation: [id number]       Scheduled Medications  azithromycin, 500 mg, Oral, Q24H  budesonide, 1 mg, Nebulization, BID - RT  cefTRIAXone, 1,000 mg, Intravenous, Q24H  enoxaparin, 40 mg, Subcutaneous, Nightly  guaiFENesin, 1,200 mg, Oral, Q12H  ipratropium-albuterol, 3 mL, Nebulization, 4x Daily - RT  Non-Formulary / Patient Supplied Medication, 1 mg, Oral, Nightly  pantoprazole, 40 mg, Oral, Q AM  predniSONE, 40 mg, Oral, Daily With Breakfast  senna-docusate sodium, 2 tablet, Oral, BID  sodium chloride, 10 mL, Intravenous, Q12H    Infusions   Diet  Diet: Regular/House; Fluid Consistency: Thin (IDDSI 0)    I have personally reviewed     []  Laboratory   []  Microbiology   []  Radiology   []  EKG/Telemetry  []   Cardiology/Vascular   []  Pathology    []  Records       Assessment/Plan     Active Hospital Problems    Diagnosis  POA    **Hypoxia [R09.02]  Yes    Asthma [J45.909]  Unknown    CAP (community acquired pneumonia) [J18.9]  Unknown    Elevated serum creatinine [R79.89]  Unknown      Resolved Hospital Problems   No resolved problems to display.       Patient is a 84-year-old female with a history of asthma, on as needed albuterol only and not  on scheduled inhalers recent admission to the hospital with pneumonia and asthma exacerbation room 10/12/24 through 10/14/2024 presented to the ED with acute worsening shortness of breath        Community-acquired pneumonia  Acute Hypoxic Respiratory failure   Asthma  Recent admission for pneumonia/asthma exacerbation on 10/12-  10/14  Oxygen dropped to 85% on room air per report- at time of admission   CTA with no PE, but did show bilateral pneumonia  WBC was 14.69 on admission, up from 15.36, however patient did receive dose during recent hospitalization  Continue scheduled DuoNeb, as needed albuterol  Continue IV ceftriaxone, azithromycin  MRSA nares ordered/strep/Legionella, negative.   WBC up a little over night   Wean off O2 as able, O2 saturation goal 90% and above. She is now on room air   Pulmonology following     Elevated creatinine   Creatinine admission was 1.02  Improved, stable, monitor creatinine 0.89 this am   Continue to monitor      Hyperglycemia  A1c 5.60  Glucose normal this am   Continue to monitor     Essential Hypertension   Has been off her Cardura for soft blood pressure   Blood pressure is creeping back - Cardura restarted this am     SCDs for DVT prophylaxis.  Full code.  Discussed with patient.  Expected Discharge Date: 10/18/2024; Expected Discharge Time:        Copied text in this note has been reviewed and is accurate as of 10/17/24.         \        TERENCE Gil  [facility]  10/17/24  10:02 EDT

## 2024-10-18 VITALS
DIASTOLIC BLOOD PRESSURE: 80 MMHG | SYSTOLIC BLOOD PRESSURE: 149 MMHG | BODY MASS INDEX: 28.77 KG/M2 | WEIGHT: 179.01 LBS | TEMPERATURE: 97.7 F | HEIGHT: 66 IN | HEART RATE: 94 BPM | OXYGEN SATURATION: 96 % | RESPIRATION RATE: 18 BRPM

## 2024-10-18 PROCEDURE — 94762 N-INVAS EAR/PLS OXIMTRY CONT: CPT

## 2024-10-18 PROCEDURE — 94664 DEMO&/EVAL PT USE INHALER: CPT

## 2024-10-18 PROCEDURE — 94799 UNLISTED PULMONARY SVC/PX: CPT

## 2024-10-18 PROCEDURE — G0378 HOSPITAL OBSERVATION PER HR: HCPCS

## 2024-10-18 PROCEDURE — 94761 N-INVAS EAR/PLS OXIMETRY MLT: CPT

## 2024-10-18 PROCEDURE — 63710000001 PREDNISONE PER 1 MG: Performed by: STUDENT IN AN ORGANIZED HEALTH CARE EDUCATION/TRAINING PROGRAM

## 2024-10-18 RX ORDER — PREDNISONE 20 MG/1
40 TABLET ORAL
Qty: 2 TABLET | Refills: 0 | Status: SHIPPED | OUTPATIENT
Start: 2024-10-19 | End: 2024-10-20

## 2024-10-18 RX ORDER — BUDESONIDE 1 MG/2ML
1 INHALANT ORAL
Qty: 240 ML | Refills: 0 | Status: SHIPPED | OUTPATIENT
Start: 2024-10-18

## 2024-10-18 RX ORDER — PANTOPRAZOLE SODIUM 40 MG/1
40 TABLET, DELAYED RELEASE ORAL
Qty: 60 TABLET | Refills: 0 | Status: SHIPPED | OUTPATIENT
Start: 2024-10-19

## 2024-10-18 RX ORDER — IPRATROPIUM BROMIDE AND ALBUTEROL SULFATE 2.5; .5 MG/3ML; MG/3ML
3 SOLUTION RESPIRATORY (INHALATION)
Qty: 720 ML | Refills: 0 | Status: SHIPPED | OUTPATIENT
Start: 2024-10-18

## 2024-10-18 RX ORDER — GUAIFENESIN 600 MG/1
1200 TABLET, EXTENDED RELEASE ORAL EVERY 12 HOURS SCHEDULED
Qty: 40 TABLET | Refills: 0 | Status: SHIPPED | OUTPATIENT
Start: 2024-10-18 | End: 2024-10-28

## 2024-10-18 RX ORDER — ALBUTEROL SULFATE 90 UG/1
2 INHALANT RESPIRATORY (INHALATION) EVERY 4 HOURS PRN
Qty: 36 G | Refills: 0 | Status: SHIPPED | OUTPATIENT
Start: 2024-10-18

## 2024-10-18 RX ORDER — CEFDINIR 300 MG/1
300 CAPSULE ORAL EVERY 12 HOURS SCHEDULED
Qty: 4 CAPSULE | Refills: 0 | Status: SHIPPED | OUTPATIENT
Start: 2024-10-18 | End: 2024-10-20

## 2024-10-18 RX ORDER — TERAZOSIN 1 MG/1
1 CAPSULE ORAL NIGHTLY
Qty: 60 CAPSULE | Refills: 0 | Status: SHIPPED | OUTPATIENT
Start: 2024-10-18

## 2024-10-18 RX ORDER — AZITHROMYCIN 500 MG/1
500 TABLET, FILM COATED ORAL DAILY
Qty: 2 TABLET | Refills: 0 | Status: SHIPPED | OUTPATIENT
Start: 2024-10-19

## 2024-10-18 RX ADMIN — Medication 10 ML: at 08:34

## 2024-10-18 RX ADMIN — IPRATROPIUM BROMIDE AND ALBUTEROL SULFATE 3 ML: 2.5; .5 SOLUTION RESPIRATORY (INHALATION) at 10:36

## 2024-10-18 RX ADMIN — PREDNISONE 40 MG: 20 TABLET ORAL at 08:33

## 2024-10-18 RX ADMIN — GUAIFENESIN 1200 MG: 600 TABLET, EXTENDED RELEASE ORAL at 08:33

## 2024-10-18 RX ADMIN — AZITHROMYCIN 500 MG: 250 TABLET, FILM COATED ORAL at 08:33

## 2024-10-18 RX ADMIN — BUDESONIDE 1 MG: 1 INHALANT ORAL at 07:00

## 2024-10-18 RX ADMIN — SENNOSIDES AND DOCUSATE SODIUM 2 TABLET: 50; 8.6 TABLET ORAL at 08:34

## 2024-10-18 RX ADMIN — PANTOPRAZOLE SODIUM 40 MG: 40 TABLET, DELAYED RELEASE ORAL at 06:24

## 2024-10-18 RX ADMIN — IPRATROPIUM BROMIDE AND ALBUTEROL SULFATE 3 ML: 2.5; .5 SOLUTION RESPIRATORY (INHALATION) at 06:57

## 2024-10-18 NOTE — PLAN OF CARE
Goal Outcome Evaluation:  Plan of Care Reviewed With: patient        Progress: improving

## 2024-10-18 NOTE — PROGRESS NOTES
[facility]  [phone]  Dr. Russ Lester    Subjective:  LOS: 0    No acute events overnight.  She has remained on room air 48 hours now.  She is ready for discharge today.  She will be going to a hotel.  The nebulizer machine was delivered yesterday.    Objective:  Vital Signs past 24hrs    Temp range: Temp (24hrs), Av.8 °F (36.6 °C), Min:97.5 °F (36.4 °C), Max:98.1 °F (36.7 °C)    BP range: BP: (136-163)/(57-82) 136/68  Pulse range: Heart Rate:  [70-82] 77  Resp rate range: Resp:  [16-18] 18  81.2 kg (179 lb 0.2 oz); Body mass index is 28.89 kg/m².    Device (Oxygen Therapy): room air     Oxygen range:SpO2:  [93 %-100 %] 97 %          No intake or output data in the 24 hours ending 10/18/24 0992      Physical Exam  Constitutional:       Appearance: Normal appearance.   HENT:      Head: Normocephalic and atraumatic.      Nose: Nose normal.      Mouth/Throat:      Mouth: Mucous membranes are moist.      Pharynx: Oropharynx is clear.   Eyes:      Extraocular Movements: Extraocular movements intact.      Conjunctiva/sclera: Conjunctivae normal.   Cardiovascular:      Rate and Rhythm: Normal rate and regular rhythm.      Pulses: Normal pulses.      Heart sounds: Normal heart sounds. No murmur heard.  Pulmonary:      Effort: Pulmonary effort is normal. No respiratory distress.      Breath sounds: Normal breath sounds. No stridor. No wheezing or rales.   Abdominal:      General: Abdomen is flat. Bowel sounds are normal.      Palpations: Abdomen is soft.   Skin:     General: Skin is warm and dry.   Neurological:      General: No focal deficit present.      Mental Status: She is alert and oriented to person, place, and time. Mental status is at baseline.   Psychiatric:         Mood and Affect: Mood normal.         Behavior: Behavior normal.            Result Review:  I have reviewed the results from last note by LPC physician and agree with these findings:  [x]  Laboratory accordion  [x]   Microbiology  [x]  Radiology  [x]  EKG/Telemetry   [x]  Cardiology/Vascular   [x]  Pathology  []  Old records  []  Other:    Medication Review:  I have reviewed the current MAR.  Antibiotics  azithromycin - 250 MG  cefdinir - 300 MG  cefTRIAXone (ROCEPHIN) 1000 mg IVPB in 100 mL NS (MBP)     Scheduled Medications  azithromycin, 500 mg, Oral, Q24H  budesonide, 1 mg, Nebulization, BID - RT  cefTRIAXone, 1,000 mg, Intravenous, Q24H  enoxaparin, 40 mg, Subcutaneous, Nightly  guaiFENesin, 1,200 mg, Oral, Q12H  ipratropium-albuterol, 3 mL, Nebulization, 4x Daily - RT  pantoprazole, 40 mg, Oral, Q AM  predniSONE, 40 mg, Oral, Daily With Breakfast  senna-docusate sodium, 2 tablet, Oral, BID  sodium chloride, 10 mL, Intravenous, Q12H  terazosin, 1 mg, Oral, Nightly      ICU Drips     PRN Medications    albuterol    senna-docusate sodium **AND** polyethylene glycol **AND** bisacodyl **AND** bisacodyl    nitroglycerin    sodium chloride    sodium chloride    Lines, Drains & Airways       Active LDAs       Name Placement date Placement time Site Days    Peripheral IV 10/15/24 1248 Left Antecubital 10/15/24  1248  Antecubital  1                    Diet Orders (active) (From admission, onward)       Start     Ordered    10/15/24 1448  Diet: Regular/House; Fluid Consistency: Thin (IDDSI 0)  Diet Effective Now         10/15/24 1447                      Assessment:  Acute hypoxic respiratory failure secondary to CAP   Asthma exacerbation  Leukocytosis      Plan of Treatment  - Continue IV ceftriaxone and PO azithromycin for 5 day to cover for CAP  - Use PO prednisone 40 mg daily for 5 days (patient wants to avoid IV steroids)   - Patient does better with nebulized medications, continue use of duonebs and add nebulized pulmicort.  - Infectious workup so far negative  - Incentive spirometer and flutter valve therapy    Overall, patient has improved.  From a pulmonary perspective, okay for discharge today.  Recommend discharge with 5  days total of antibiotics with cefdinir and azithromycin and 5 days total prednisone.  Patient has nebulizer machine.  Upon discharge, recommend discharge with Pulmicort twice daily for 30 days with 1 refill and DuoNebs for 30 days.  Of note, she will be back in [place] within 3 to 6 weeks, so after that she can get refills of her medications from her PCP and establish care with a pulmonologist down there if need be.      Russ Lester MD  [facility], [facility]  Pulmonary and Critical Care Medicine

## 2024-10-18 NOTE — DISCHARGE SUMMARY
"Date of Admission: 10/15/2024  Date of Discharge:  10/18/2024  Primary Care Physician: Provider, No Known     Discharge Diagnosis:  Active Hospital Problems    Diagnosis  POA    **Hypoxia [R09.02]  Yes    Essential hypertension [I10]  Yes    Hyperglycemia [R73.9]  No    Asthma [J45.909]  Yes    CAP (community acquired pneumonia) [J18.9]  Yes    Elevated serum creatinine [R79.89]  Yes      Resolved Hospital Problems   No resolved problems to display.       Presenting Problem/History of Present Illness:  History of asthma [Z87.09]  Hypoxia [R09.02]  History of pneumonia [Z87.01]  Dyspnea, unspecified type [R06.00]     Hospital Course:  The patient is a 84 y.o. female with a history of asthma and hypertension who presented with cough and shortness of breath. Please see admission H&P for further details. She was found to have an asthma exacerbation and pneumonia. Blood cultures, respiratory viral panel, and streptococcus pneumoniae and legionella antigens were negative. She was started on prednisone and nebulized bronchodilators and her symptoms improved. She is doing well on room air. She is medically stable and will be discharged home on the remainder of a 5 day course of prednisone and antibiotics. She will additionally be prescribed pulmicort and duo-nebs with a 2 month supply as she lives in [place] and will be returning there in 3-6 weeks and can follow up with her PCP at that time. She was provided with a nebulizer as well.     Exam Today:  Blood pressure 149/80, pulse 94, temperature 97.7 °F (36.5 °C), temperature source Oral, resp. rate 18, height 167.6 cm (66\"), weight 81.2 kg (179 lb 0.2 oz), SpO2 96%.  Vitals and nursing note reviewed.   Constitutional:       General: She is not in acute distress.     Appearance: Normal appearance.   HENT:      Head: Normocephalic and atraumatic.      Mouth/Throat:      Mouth: Mucous membranes are moist.      Pharynx: No posterior oropharyngeal erythema.   Eyes:      " General: No scleral icterus.  Neck:      Vascular: No JVD.   Cardiovascular:      Rate and Rhythm: Normal rate and regular rhythm.      Pulses: Normal pulses.      Heart sounds: Normal heart sounds. No murmur heard.  Pulmonary:      Effort: Pulmonary effort is normal. No respiratory distress.      Breath sounds: Normal breath sounds.   Abdominal:      General: Bowel sounds are normal. There is no distension.      Palpations: Abdomen is soft.      Tenderness: There is no abdominal tenderness.   Musculoskeletal:         General: No swelling or tenderness.      Cervical back: Neck supple.   Skin:     General: Skin is warm and dry.      Capillary Refill: Capillary refill takes less than 2 seconds.      Coloration: Skin is not jaundiced.      Findings: No rash.   Neurological:      General: No focal deficit present.      Mental Status: She is alert and oriented to person, place, and time. Mental status is at baseline.   Psychiatric:         Mood and Affect: Mood normal.         Behavior: Behavior normal.     Procedures Performed:  CT ANGIOGRAM OF THE CHEST. MULTIPLE CORONAL, SAGITTAL, AND 3-D  RECONSTRUCTIONS-10/16/24   HISTORY: 84-year-old female with cough and shortness of breath. Right  mastectomy in the past.     TECHNIQUE: Radiation dose reduction techniques were utilized, including  automated exposure control and exposure modulation based on body size.   CT angiogram of the chest was performed with 2mm images following the  administration of IV contrast. Multiple coronal, sagittal, and 3-D  reconstruction images were obtained. No previous CT for comparison.     FINDINGS:  1. There is no convincing evidence for pulmonary thromboemboli.     2. There is near complete opacification of the bilateral lower lobe  bronchi, more prominent on the right and there is also significant  bronchial thickening and partial opacification at the right middle lobe  and lingula, consistent with bronchitis. The right interlobar  bronchus  is nearly completely opacified as well.     There are extensive peribronchial reticular nodular opacities and very  small ill-defined airspace opacities of both lower lobes consistent with  bronchiolitis and developing pneumonia. There are no pleural or  pericardial effusions, but there is pulmonary vascular prominence  diffusely.     There is no lymphadenopathy within the chest and there is no axillary  lymphadenopathy.     3. No acute abnormality is seen at the visualized upper abdomen. An  approximately 8.5 cm cyst is noted at the upper pole of the left kidney.       Consults:   Consults       Date and Time Order Name Status Description    10/15/2024 12:33 PM Inpatient Pulmonology Consult Completed     10/15/2024 11:46 AM [facility] (on-call MD unless specified) Details               Discharge Disposition:  Home or Self Care    Discharge Medications:     Discharge Medications        New Medications        Instructions Start Date   azithromycin 250 MG tablet  Commonly known as: ZITHROMAX   Indications: Pneumonia   Start Date: October 19, 2024     budesonide 1 MG/2ML nebulizer solution  Commonly known as: PULMICORT   1 mg, Nebulization, 2 Times Daily - RT      guaiFENesin 600 MG 12 hr tablet  Commonly known as: MUCINEX   1,200 mg, Oral, Every 12 Hours Scheduled      ipratropium-albuterol 0.5-2.5 mg/3 ml nebulizer  Commonly known as: DUO-NEB   3 mL, Nebulization, 4 Times Daily - RT      pantoprazole 40 MG EC tablet  Commonly known as: PROTONIX   40 mg, Oral, Every Early Morning   Start Date: October 19, 2024     predniSONE 20 MG tablet  Commonly known as: DELTASONE   40 mg, Oral, Daily With Breakfast   Start Date: October 19, 2024     terazosin 1 MG capsule  Commonly known as: HYTRIN   1 mg, Oral, Nightly             Changes to Medications        Instructions Start Date   cefdinir 300 MG capsule  Commonly known as: OMNICEF  What changed: additional instructions   300 mg, Oral, Every 12 Hours Scheduled              Continue These Medications        Instructions Start Date   albuterol sulfate  (90 Base) MCG/ACT inhaler  Commonly known as: PROVENTIL HFA;VENTOLIN HFA;PROAIR HFA   2 puffs, Inhalation, Every 4 Hours PRN      Aspirin 325 MG capsule   1 tablet, Oral, As Needed             Stop These Medications      doxazosin 1 MG tablet  Commonly known as: CARDURA              Discharge Diet:   Diet Instructions       Diet: Regular/House Diet; Regular (IDDSI 7); Thin (IDDSI 0)      Discharge Diet: Regular/House Diet    Texture: Regular (IDDSI 7)    Fluid Consistency: Thin (IDDSI 0)            Activity at Discharge:   Activity Instructions       Activity as Tolerated              Follow-up Appointments:  No future appointments.  Additional Instructions for the Follow-ups that You Need to Schedule       Discharge Follow-up with PCP   As directed       Currently Documented PCP:    Provider, No Known    PCP Phone Number:    None     Follow Up Details: as scheduled                   Kamar Young MD  10/18/24  12:13 EDT    Time Spent on Discharge Activities: Greater than 30 minutes.

## 2024-10-19 NOTE — CASE MANAGEMENT/SOCIAL WORK
Case Management Discharge Note      Final Note: home; neb from \[facility]\""    Provided Post Acute Provider List?: N/A  Provided Post Acute Provider Quality & Resource List?: N/A    Selected Continued Care - Discharged on 10/18/2024 Admission date: 10/15/2024 - Discharge disposition: Home or Self Care      Destination    No services have been selected for the patient.                Durable Medical Equipment Coordination complete.      Service Provider Services Address Phone Fax Patient Preferred    [facility] [address] [address] [phone] --              Dialysis/Infusion    No services have been selected for the patient.                Home Medical Care    No services have been selected for the patient.                Therapy    No services have been selected for the patient.                Community Resources    No services have been selected for the patient.                Community & DME    No services have been selected for the patient.                    Selected Continued Care - Prior Encounters Includes continued care and service providers with selected services from prior encounters from 7/17/2024 to 10/18/2024      Discharged on 10/14/2024 Admission date: 10/12/2024 - Discharge disposition: Home or Self Care      Home Medical Care       Service Provider Services Address Phone Fax Patient Preferred    [facility] [address] [address] [phone] --                          Transportation Services  Private: Car    Final Discharge Disposition Code: 01 - home or self-care